# Patient Record
Sex: MALE | URBAN - METROPOLITAN AREA
[De-identification: names, ages, dates, MRNs, and addresses within clinical notes are randomized per-mention and may not be internally consistent; named-entity substitution may affect disease eponyms.]

---

## 2021-05-03 ENCOUNTER — OUTPATIENT (OUTPATIENT)
Dept: OUTPATIENT SERVICES | Facility: HOSPITAL | Age: 76
LOS: 1 days | End: 2021-05-03
Payer: MEDICARE

## 2021-05-03 DIAGNOSIS — R13.10 DYSPHAGIA, UNSPECIFIED: ICD-10-CM

## 2021-05-03 PROCEDURE — 74220 X-RAY XM ESOPHAGUS 1CNTRST: CPT

## 2021-05-03 PROCEDURE — 74220 X-RAY XM ESOPHAGUS 1CNTRST: CPT | Mod: 26

## 2021-05-31 ENCOUNTER — INPATIENT (INPATIENT)
Facility: HOSPITAL | Age: 76
LOS: 6 days | Discharge: NURSING, REHAB OR LONG-TERM CARE | DRG: 56 | End: 2021-06-07
Attending: FAMILY MEDICINE | Admitting: HOSPITALIST
Payer: MEDICARE

## 2021-05-31 VITALS
OXYGEN SATURATION: 95 % | SYSTOLIC BLOOD PRESSURE: 164 MMHG | RESPIRATION RATE: 18 BRPM | HEART RATE: 75 BPM | TEMPERATURE: 98 F | DIASTOLIC BLOOD PRESSURE: 91 MMHG

## 2021-05-31 DIAGNOSIS — I63.9 CEREBRAL INFARCTION, UNSPECIFIED: ICD-10-CM

## 2021-05-31 LAB
ALBUMIN SERPL ELPH-MCNC: 4 G/DL — SIGNIFICANT CHANGE UP (ref 3.3–5.2)
ALP SERPL-CCNC: 88 U/L — SIGNIFICANT CHANGE UP (ref 40–120)
ALT FLD-CCNC: <5 U/L — SIGNIFICANT CHANGE UP
ANION GAP SERPL CALC-SCNC: 9 MMOL/L — SIGNIFICANT CHANGE UP (ref 5–17)
APTT BLD: 27.9 SEC — SIGNIFICANT CHANGE UP (ref 27.5–35.5)
AST SERPL-CCNC: 14 U/L — SIGNIFICANT CHANGE UP
BASOPHILS # BLD AUTO: 0.02 K/UL — SIGNIFICANT CHANGE UP (ref 0–0.2)
BASOPHILS NFR BLD AUTO: 0.3 % — SIGNIFICANT CHANGE UP (ref 0–2)
BILIRUB SERPL-MCNC: <0.2 MG/DL — LOW (ref 0.4–2)
BUN SERPL-MCNC: 18.1 MG/DL — SIGNIFICANT CHANGE UP (ref 8–20)
CALCIUM SERPL-MCNC: 9.3 MG/DL — SIGNIFICANT CHANGE UP (ref 8.6–10.2)
CHLORIDE SERPL-SCNC: 104 MMOL/L — SIGNIFICANT CHANGE UP (ref 98–107)
CHOLEST SERPL-MCNC: 171 MG/DL — SIGNIFICANT CHANGE UP
CO2 SERPL-SCNC: 28 MMOL/L — SIGNIFICANT CHANGE UP (ref 22–29)
CREAT SERPL-MCNC: 0.97 MG/DL — SIGNIFICANT CHANGE UP (ref 0.5–1.3)
EOSINOPHIL # BLD AUTO: 0.09 K/UL — SIGNIFICANT CHANGE UP (ref 0–0.5)
EOSINOPHIL NFR BLD AUTO: 1.5 % — SIGNIFICANT CHANGE UP (ref 0–6)
GLUCOSE BLDC GLUCOMTR-MCNC: 33 MG/DL — CRITICAL LOW (ref 70–99)
GLUCOSE SERPL-MCNC: 94 MG/DL — SIGNIFICANT CHANGE UP (ref 70–99)
HCT VFR BLD CALC: 36.6 % — LOW (ref 39–50)
HDLC SERPL-MCNC: 84 MG/DL — SIGNIFICANT CHANGE UP
HGB BLD-MCNC: 11.8 G/DL — LOW (ref 13–17)
IMM GRANULOCYTES NFR BLD AUTO: 0.2 % — SIGNIFICANT CHANGE UP (ref 0–1.5)
INR BLD: 1.1 RATIO — SIGNIFICANT CHANGE UP (ref 0.88–1.16)
LIPID PNL WITH DIRECT LDL SERPL: 81 MG/DL — SIGNIFICANT CHANGE UP
LYMPHOCYTES # BLD AUTO: 2.56 K/UL — SIGNIFICANT CHANGE UP (ref 1–3.3)
LYMPHOCYTES # BLD AUTO: 42.4 % — SIGNIFICANT CHANGE UP (ref 13–44)
MCHC RBC-ENTMCNC: 30.2 PG — SIGNIFICANT CHANGE UP (ref 27–34)
MCHC RBC-ENTMCNC: 32.2 GM/DL — SIGNIFICANT CHANGE UP (ref 32–36)
MCV RBC AUTO: 93.6 FL — SIGNIFICANT CHANGE UP (ref 80–100)
MONOCYTES # BLD AUTO: 0.54 K/UL — SIGNIFICANT CHANGE UP (ref 0–0.9)
MONOCYTES NFR BLD AUTO: 8.9 % — SIGNIFICANT CHANGE UP (ref 2–14)
NEUTROPHILS # BLD AUTO: 2.82 K/UL — SIGNIFICANT CHANGE UP (ref 1.8–7.4)
NEUTROPHILS NFR BLD AUTO: 46.7 % — SIGNIFICANT CHANGE UP (ref 43–77)
NON HDL CHOLESTEROL: 87 MG/DL — SIGNIFICANT CHANGE UP
PLATELET # BLD AUTO: 206 K/UL — SIGNIFICANT CHANGE UP (ref 150–400)
POTASSIUM SERPL-MCNC: 3.8 MMOL/L — SIGNIFICANT CHANGE UP (ref 3.5–5.3)
POTASSIUM SERPL-SCNC: 3.8 MMOL/L — SIGNIFICANT CHANGE UP (ref 3.5–5.3)
PROT SERPL-MCNC: 7.4 G/DL — SIGNIFICANT CHANGE UP (ref 6.6–8.7)
PROTHROM AB SERPL-ACNC: 12.7 SEC — SIGNIFICANT CHANGE UP (ref 10.6–13.6)
RBC # BLD: 3.91 M/UL — LOW (ref 4.2–5.8)
RBC # FLD: 13.4 % — SIGNIFICANT CHANGE UP (ref 10.3–14.5)
SODIUM SERPL-SCNC: 141 MMOL/L — SIGNIFICANT CHANGE UP (ref 135–145)
TRIGL SERPL-MCNC: 30 MG/DL — SIGNIFICANT CHANGE UP
TROPONIN T SERPL-MCNC: <0.01 NG/ML — SIGNIFICANT CHANGE UP (ref 0–0.06)
WBC # BLD: 6.04 K/UL — SIGNIFICANT CHANGE UP (ref 3.8–10.5)
WBC # FLD AUTO: 6.04 K/UL — SIGNIFICANT CHANGE UP (ref 3.8–10.5)

## 2021-05-31 PROCEDURE — 70450 CT HEAD/BRAIN W/O DYE: CPT | Mod: 26,59,MA

## 2021-05-31 PROCEDURE — 93010 ELECTROCARDIOGRAM REPORT: CPT

## 2021-05-31 PROCEDURE — 0042T: CPT

## 2021-05-31 PROCEDURE — 71045 X-RAY EXAM CHEST 1 VIEW: CPT | Mod: 26

## 2021-05-31 PROCEDURE — 70496 CT ANGIOGRAPHY HEAD: CPT | Mod: 26,MA

## 2021-05-31 PROCEDURE — 99291 CRITICAL CARE FIRST HOUR: CPT

## 2021-05-31 PROCEDURE — 70498 CT ANGIOGRAPHY NECK: CPT | Mod: 26,MA

## 2021-05-31 RX ORDER — ASPIRIN/CALCIUM CARB/MAGNESIUM 324 MG
81 TABLET ORAL DAILY
Refills: 0 | Status: DISCONTINUED | OUTPATIENT
Start: 2021-05-31 | End: 2021-06-07

## 2021-05-31 RX ORDER — DEXTROSE 50 % IN WATER 50 %
50 SYRINGE (ML) INTRAVENOUS ONCE
Refills: 0 | Status: COMPLETED | OUTPATIENT
Start: 2021-05-31 | End: 2021-05-31

## 2021-05-31 RX ADMIN — Medication 50 MILLILITER(S): at 22:10

## 2021-05-31 NOTE — PROGRESS NOTE ADULT - SUBJECTIVE AND OBJECTIVE BOX
Telestroke 05.31.2021  (Dr. Jin)    76 y/o male with PMHx of Glaucoma, Parkinson's, dysphagia (tolerates solids), and HTN presents to ED c/o weakness. Patient's wife reports while patient was eating at about 7pm tonight, he was missing his mouth, and going towards his eyes, and said he was unable to use his right side. As per patient, at 4pm, he noticed he was having trouble using his right hand (clumsy hand).    MEDICATIONS:  aspirin  chewable 81 milliGRAM(s) Oral daily    PHYSICAL EXAMINATION    ICU Vital Signs Last 24 Hrs  T(C): 36.7 (31 May 2021 21:46), Max: 36.7 (31 May 2021 21:46)  T(F): 98 (31 May 2021 21:46), Max: 98 (31 May 2021 21:46)  HR: 56 (31 May 2021 22:55) (56 - 75)  BP: 152/71 (31 May 2021 22:55) (152/71 - 164/91)  RR: 20 (31 May 2021 22:55) (18 - 20)  SpO2: 96% (31 May 2021 22:55) (95% - 96%)    PHYSICAL EXAM: Patient weight 77.4 kg  · CONSTITUTIONAL: Well appearing, awake, alert, oriented to person, place, time/situation and in no apparent distress.  · ENMT: Airway patent, Nasal mucosa clear. Mouth with normal mucosa. Throat has no vesicles, no oropharyngeal exudates and uvula is midline.  · CARDIAC: Normal rate, regular rhythm.  Heart sounds S1, S2.  No murmurs, rubs or gallops.  · RESPIRATORY: Breath sounds clear and equal bilaterally.  · GASTROINTESTINAL: Abdomen soft, non-tender, no guarding.  · NEUROLOGICAL: NIH score of 3  · SKIN: Cool to touch, and dry    NIH Stroke Scale:   · NIH Stroke Scale: LOC	(0) Alert; keenly responsive  · NIH Stroke Scale: LOC Question	(0) Answers both questions correctly  · NIH Stroke Scale: LOC Command	(0) Performs both tasks correctly  · NIH Stroke Scale: Gaze	(0) Normal  · NIH Stroke Scale: Visual	(0) No visual loss  · NIH Stroke Scale: Facial	(0) Normal symmetrical movements  · NIH Stroke Scale: Arm Left	(0) No drift; limb holds 90 (or 45) degrees for full 10 secs  · NIH Stroke Scale: Arm Right	(0) No drift; limb holds 90 (or 45) degrees for full 10 secs  · NIH Stroke Scale: Leg Left	(0) No drift; leg holds 30 degree position for full 5 secs  · NIH Stroke Scale: Leg Right	(0) No drift; leg holds 30 degree position for full 5 secs  · NIH Stroke Scale: Ataxia	(2) Present in two limbs  · NIH Stroke Scale: Sensory	(1) Mild-to-moderate sensory loss; patient feels pinprick is less sharp or is dull on the affected side; or there is a loss of superficial pain with pinprick, but patient is aware of being touched  · NIH Stroke Scale: Language	(0) No aphasia; normal  · NIH Stroke Scale: Dysarthria	(0) Normal  · NIH Stroke Scale: Extinct Inattention	(0) No abnormality  · NIH Stroke Scale: Total	3    LABS:                        11.8   6.04  )-----------( 206      ( 31 May 2021 21:54 )             36.6     05-31    141  |  104  |  18.1  ----------------------------<  94  3.8   |  28.0  |  0.97    Ca    9.3      31 May 2021 21:54    TPro  7.4  /  Alb  4.0  /  TBili  <0.2<L>  /  DBili  x   /  AST  14  /  ALT  <5  /  AlkPhos  88  05-31    PT/INR - ( 31 May 2021 21:54 )   PT: 12.7 sec;   INR: 1.10 ratio    PTT - ( 31 May 2021 21:54 )  PTT:27.9 sec    CARDIAC MARKERS ( 31 May 2021 21:54 )  x     / <0.01 ng/mL / x     / x     / x        CAPILLARY BLOOD GLUCOSE    POCT Blood Glucose.: 33 mg/dL (31 May 2021 23:30)  POCT Blood Glucose.: 41 mg/dL (31 May 2021 22:25)  POCT Blood Glucose.: 37 mg/dL (31 May 2021 22:23)  POCT Blood Glucose.: 58 mg/dL (31 May 2021 21:59)  POCT Blood Glucose.: 38 mg/dL (31 May 2021 21:58)    Unremarkable, unenhanced CT head.  CBF <30%: 0 ml  Tmax > 6s: 0 ml  Mismatch volume=  0 ml  CT PERFUSION: Normal perfusion..  CTA BRAIN: Patent intracranial circulation. No flow-limiting stenosis or occlusion.  CTA NECK: Patent cervical vasculature. No flow limiting stenosis or occlusion.    Recommendations:  (1) correction of hypoglycemia (persistence of neurological symptoms after D50 amp),  (2) ASA 81 mg daily, Plavix 75 mg daily  (3) goal sBP 130-160 mmHg  (4) MRI to R/O capsular ischemic event - reassess dAPT after MRI  (5) TTE  (6) statin  (7) stroke work-up    Thank you for referral.  Khurram Pool MD,  [facility]  [phone]             Telestroke 05.31.2021  (Dr. Jin)    76 y/o male with PMHx of Glaucoma, Parkinson's, dysphagia (tolerates solids), and HTN presents to ED c/o weakness. Patient's wife reports while patient was eating at about 7pm tonight, he was missing his mouth, and going towards his eyes, and said he was unable to use his right side. As per patient, at 4pm, he noticed he was having trouble using his right hand (clumsy hand).    MEDICATIONS:  aspirin  chewable 81 milliGRAM(s) Oral daily    PHYSICAL EXAMINATION    ICU Vital Signs Last 24 Hrs  T(C): 36.7 (31 May 2021 21:46), Max: 36.7 (31 May 2021 21:46)  T(F): 98 (31 May 2021 21:46), Max: 98 (31 May 2021 21:46)  HR: 56 (31 May 2021 22:55) (56 - 75)  BP: 152/71 (31 May 2021 22:55) (152/71 - 164/91)  RR: 20 (31 May 2021 22:55) (18 - 20)  SpO2: 96% (31 May 2021 22:55) (95% - 96%)    PHYSICAL EXAM: Patient weight 77.4 kg  · CONSTITUTIONAL: Well appearing, awake, alert, oriented to person, place, time/situation and in no apparent distress.  · ENMT: Airway patent, Nasal mucosa clear. Mouth with normal mucosa. Throat has no vesicles, no oropharyngeal exudates and uvula is midline.  · CARDIAC: Normal rate, regular rhythm.  Heart sounds S1, S2.  No murmurs, rubs or gallops.  · RESPIRATORY: Breath sounds clear and equal bilaterally.  · GASTROINTESTINAL: Abdomen soft, non-tender, no guarding.  · NEUROLOGICAL: NIH score of 3  · SKIN: Cool to touch, and dry    NIH Stroke Scale:   · NIH Stroke Scale: LOC	(0) Alert; keenly responsive  · NIH Stroke Scale: LOC Question	(0) Answers both questions correctly  · NIH Stroke Scale: LOC Command	(0) Performs both tasks correctly  · NIH Stroke Scale: Gaze	(0) Normal  · NIH Stroke Scale: Visual	(0) No visual loss  · NIH Stroke Scale: Facial	(0) Normal symmetrical movements  · NIH Stroke Scale: Arm Left	(0) No drift; limb holds 90 (or 45) degrees for full 10 secs  · NIH Stroke Scale: Arm Right	(0) No drift; limb holds 90 (or 45) degrees for full 10 secs  · NIH Stroke Scale: Leg Left	(0) No drift; leg holds 30 degree position for full 5 secs  · NIH Stroke Scale: Leg Right	(0) No drift; leg holds 30 degree position for full 5 secs  · NIH Stroke Scale: Ataxia	(2) Present in two limbs  · NIH Stroke Scale: Sensory	(1) Mild-to-moderate sensory loss; patient feels pinprick is less sharp or is dull on the affected side; or there is a loss of superficial pain with pinprick, but patient is aware of being touched  · NIH Stroke Scale: Language	(0) No aphasia; normal  · NIH Stroke Scale: Dysarthria	(0) Normal  · NIH Stroke Scale: Extinct Inattention	(0) No abnormality  · NIH Stroke Scale: Total	3    LABS:                        11.8   6.04  )-----------( 206      ( 31 May 2021 21:54 )             36.6     05-31    141  |  104  |  18.1  ----------------------------<  94  3.8   |  28.0  |  0.97    Ca    9.3      31 May 2021 21:54    TPro  7.4  /  Alb  4.0  /  TBili  <0.2<L>  /  DBili  x   /  AST  14  /  ALT  <5  /  AlkPhos  88  05-31    PT/INR - ( 31 May 2021 21:54 )   PT: 12.7 sec;   INR: 1.10 ratio    PTT - ( 31 May 2021 21:54 )  PTT:27.9 sec    CARDIAC MARKERS ( 31 May 2021 21:54 )  x     / <0.01 ng/mL / x     / x     / x        CAPILLARY BLOOD GLUCOSE    POCT Blood Glucose.: 33 mg/dL (31 May 2021 23:30)  POCT Blood Glucose.: 41 mg/dL (31 May 2021 22:25)  POCT Blood Glucose.: 37 mg/dL (31 May 2021 22:23)  POCT Blood Glucose.: 58 mg/dL (31 May 2021 21:59)  POCT Blood Glucose.: 38 mg/dL (31 May 2021 21:58)    Unremarkable, unenhanced CT head.  CBF <30%: 0 ml  Tmax > 6s: 0 ml  Mismatch volume=  0 ml  CT PERFUSION: Normal perfusion..  CTA BRAIN: Patent intracranial circulation. No flow-limiting stenosis or occlusion.  CTA NECK: Patent cervical vasculature. No flow limiting stenosis or occlusion.    Recommendations:  (1) correction of hypoglycemia (persistence of neurological symptoms after D50 amp),  (2) ASA 81 mg daily  (3) goal sBP 130-160 mmHg  (4) MRI to R/O capsular ischemic event - reassess dAPT after MRI  (5) TTE  (6) statin  (7) stroke work-up    Thank you for referral.  Khurram Pool MD,  [facility]  [phone]

## 2021-05-31 NOTE — ED PROVIDER NOTE - PROGRESS NOTE DETAILS
Hasmukh ROMO for ED attending, Dr. Payne. Spoke with Dr. Sonu Pool, will continue conversation after patient done receiving scans to talk about possible tPA administration. Will talk to wife to ascertain a better last well known time for patient. Hasmukh ROMO for ED attending, Dr. Payne. Patient found to have mild hypoglycemia, was given an amp of Dextrose without any change in symptoms. Hasmukh ROMO for ED attending, Dr. Payne. Patient found to have mild hypoglycemia, was given an amp of Dextrose without any change in symptoms. No sign of acute hypoglycemia; diaphoresis, confusion, nausea, vomiting and stable  neurologic symptoms Patient stable. Patient awake, alert, and neurologically stable. Accu-Chek are as noted but bmp is noted to be normal. Unclear cause of abnl findings on accucheck but normal cmp and bml. Patient will be continued to monitored.  Repeat BMP ordered.

## 2021-05-31 NOTE — ED ADULT NURSE NOTE - OBJECTIVE STATEMENT
A&Ox4, RR even and unlabored. Speaking in full coherent sentences.  Skin is warm and dry.  PT presenting to ED for right upper extremity weakness.  As per pts wife she noticed at 7pm that the pt was having difficulty feeding himself.  PT states he fist noticed the weakness around 4:30pm while trying to complete a puzzle. Code Stroke activated, patient brought ot CT. Scan.  Hx of parkinson's, HTN and glaucoma.

## 2021-05-31 NOTE — ED ADULT TRIAGE NOTE - CHIEF COMPLAINT QUOTE
Pt. BIBA for new onset right sided weakness. LKW 1930. Pt. wife states he was having difficulty using his hand eating. Pt. has chronic left sided weakness form parkinson's. Pt. has equal strength bilaterally, but states his right arm feels weaker then normal.  MD Sarmiento called to bedside for stroke eval. Code Stroke called. Pt. BIBA for new onset right sided weakness. LKW 1930. Pt. wife states he was having difficulty using his right arm, missing his mouth with the spoon. Pt. has chronic left sided weakness form parkinson's. Pt. has equal strength bilaterally, but states his right arm feels weaker then normal.  MD Sarmiento called to bedside for stroke eval. Code Stroke called.

## 2021-05-31 NOTE — ED ADULT NURSE REASSESSMENT NOTE - NS ED NURSE REASSESS COMMENT FT1
Repeat FS performed following Dextrose.  2 machines used both with low reading in the 40s and 30s.  Dr. Payne made aware.  Awaiting BMP results.  No new orders at this time.  PT remain asymptomatic.  A&Ox4.

## 2021-05-31 NOTE — ED PROVIDER NOTE - OBJECTIVE STATEMENT
76 y/o male with PMHx of Glaucoma, Parkinson's, and HTN presents to ED c/o weakness. Patient's wife reports while patient was eating at about 7pm tonight, he was missing his mouth, and going towards his eyes, and said he was unable to use his right side. As per patient, at 4pm, he noticed he was having trouble using his right hand.     Neurologist: Dr. Feliz

## 2021-05-31 NOTE — ED ADULT NURSE NOTE - NSIMPLEMENTINTERV_GEN_ALL_ED
Implemented All Fall Risk Interventions:  [place] to call system. Call bell, personal items and telephone within reach. Instruct patient to call for assistance. Room bathroom lighting operational. Non-slip footwear when patient is off stretcher. Physically safe environment: no spills, clutter or unnecessary equipment. Stretcher in lowest position, wheels locked, appropriate side rails in place. Provide visual cue, wrist band, yellow gown, etc. Monitor gait and stability. Monitor for mental status changes and reorient to person, place, and time. Review medications for side effects contributing to fall risk. Reinforce activity limits and safety measures with patient and family.

## 2021-05-31 NOTE — ED PROVIDER NOTE - CARE PLAN
Principal Discharge DX:	Cerebrovascular accident (CVA), unspecified mechanism  Secondary Diagnosis:	Essential hypertension

## 2021-05-31 NOTE — ED ADULT NURSE NOTE - CHIEF COMPLAINT QUOTE
Pt. BIBA for new onset right sided weakness. LKW 1930. Pt. wife states he was having difficulty using his right arm, missing his mouth with the spoon. Pt. has chronic left sided weakness form parkinson's. Pt. has equal strength bilaterally, but states his right arm feels weaker then normal.  MD Sarmiento called to bedside for stroke eval. Code Stroke called.

## 2021-05-31 NOTE — ED ADULT NURSE REASSESSMENT NOTE - NS ED NURSE REASSESS COMMENT FT1
PT FS continues to ready in the 30s, Dr. Payne made aware.  Pt shows no decline in status.  Is A&Ox4.  Repeat BMP collected and sent to lab to compare results.

## 2021-06-01 DIAGNOSIS — I63.9 CEREBRAL INFARCTION, UNSPECIFIED: ICD-10-CM

## 2021-06-01 DIAGNOSIS — H40.1130 PRIMARY OPEN-ANGLE GLAUCOMA, BILATERAL, STAGE UNSPECIFIED: ICD-10-CM

## 2021-06-01 DIAGNOSIS — I10 ESSENTIAL (PRIMARY) HYPERTENSION: ICD-10-CM

## 2021-06-01 DIAGNOSIS — E16.1 OTHER HYPOGLYCEMIA: ICD-10-CM

## 2021-06-01 DIAGNOSIS — N40.0 BENIGN PROSTATIC HYPERPLASIA WITHOUT LOWER URINARY TRACT SYMPTOMS: ICD-10-CM

## 2021-06-01 DIAGNOSIS — G20 PARKINSON'S DISEASE: ICD-10-CM

## 2021-06-01 LAB
A1C WITH ESTIMATED AVERAGE GLUCOSE RESULT: 5.5 % — SIGNIFICANT CHANGE UP (ref 4–5.6)
ANION GAP SERPL CALC-SCNC: 10 MMOL/L — SIGNIFICANT CHANGE UP (ref 5–17)
ANION GAP SERPL CALC-SCNC: 9 MMOL/L — SIGNIFICANT CHANGE UP (ref 5–17)
BASE EXCESS BLDV CALC-SCNC: 5.3 MMOL/L — HIGH (ref -2–2)
BUN SERPL-MCNC: 14.7 MG/DL — SIGNIFICANT CHANGE UP (ref 8–20)
BUN SERPL-MCNC: 15.2 MG/DL — SIGNIFICANT CHANGE UP (ref 8–20)
CALCIUM SERPL-MCNC: 9.1 MG/DL — SIGNIFICANT CHANGE UP (ref 8.6–10.2)
CALCIUM SERPL-MCNC: 9.4 MG/DL — SIGNIFICANT CHANGE UP (ref 8.6–10.2)
CHLORIDE SERPL-SCNC: 104 MMOL/L — SIGNIFICANT CHANGE UP (ref 98–107)
CHLORIDE SERPL-SCNC: 104 MMOL/L — SIGNIFICANT CHANGE UP (ref 98–107)
CO2 SERPL-SCNC: 26 MMOL/L — SIGNIFICANT CHANGE UP (ref 22–29)
CO2 SERPL-SCNC: 30 MMOL/L — HIGH (ref 22–29)
CREAT SERPL-MCNC: 0.74 MG/DL — SIGNIFICANT CHANGE UP (ref 0.5–1.3)
CREAT SERPL-MCNC: 0.93 MG/DL — SIGNIFICANT CHANGE UP (ref 0.5–1.3)
ESTIMATED AVERAGE GLUCOSE: 111 MG/DL — SIGNIFICANT CHANGE UP (ref 68–114)
GAS PNL BLDV: SIGNIFICANT CHANGE UP
GLUCOSE SERPL-MCNC: 125 MG/DL — HIGH (ref 70–99)
GLUCOSE SERPL-MCNC: 76 MG/DL — SIGNIFICANT CHANGE UP (ref 70–99)
HCO3 BLDV-SCNC: 28 MMOL/L — HIGH (ref 20–26)
PCO2 BLDV: 63 MMHG — HIGH (ref 35–50)
PH BLDV: 7.33 — SIGNIFICANT CHANGE UP (ref 7.32–7.43)
PO2 BLDV: 32 MMHG — SIGNIFICANT CHANGE UP (ref 25–45)
POTASSIUM SERPL-MCNC: 3.7 MMOL/L — SIGNIFICANT CHANGE UP (ref 3.5–5.3)
POTASSIUM SERPL-MCNC: 4 MMOL/L — SIGNIFICANT CHANGE UP (ref 3.5–5.3)
POTASSIUM SERPL-SCNC: 3.7 MMOL/L — SIGNIFICANT CHANGE UP (ref 3.5–5.3)
POTASSIUM SERPL-SCNC: 4 MMOL/L — SIGNIFICANT CHANGE UP (ref 3.5–5.3)
SAO2 % BLDV: 54 % — SIGNIFICANT CHANGE UP
SARS-COV-2 RNA SPEC QL NAA+PROBE: SIGNIFICANT CHANGE UP
SODIUM SERPL-SCNC: 140 MMOL/L — SIGNIFICANT CHANGE UP (ref 135–145)
SODIUM SERPL-SCNC: 143 MMOL/L — SIGNIFICANT CHANGE UP (ref 135–145)

## 2021-06-01 PROCEDURE — 99223 1ST HOSP IP/OBS HIGH 75: CPT

## 2021-06-01 PROCEDURE — 99231 SBSQ HOSP IP/OBS SF/LOW 25: CPT | Mod: 95

## 2021-06-01 PROCEDURE — 93306 TTE W/DOPPLER COMPLETE: CPT | Mod: 26

## 2021-06-01 PROCEDURE — 12345: CPT | Mod: NC

## 2021-06-01 RX ORDER — CARBIDOPA AND LEVODOPA 25; 100 MG/1; MG/1
2 TABLET ORAL
Qty: 0 | Refills: 0 | DISCHARGE

## 2021-06-01 RX ORDER — CARBIDOPA AND LEVODOPA 25; 100 MG/1; MG/1
2 TABLET ORAL DAILY
Refills: 0 | Status: DISCONTINUED | OUTPATIENT
Start: 2021-06-01 | End: 2021-06-07

## 2021-06-01 RX ORDER — ATORVASTATIN CALCIUM 80 MG/1
80 TABLET, FILM COATED ORAL AT BEDTIME
Refills: 0 | Status: DISCONTINUED | OUTPATIENT
Start: 2021-06-01 | End: 2021-06-07

## 2021-06-01 RX ORDER — ACETAZOLAMIDE 250 MG/1
1 TABLET ORAL
Qty: 0 | Refills: 0 | DISCHARGE

## 2021-06-01 RX ORDER — ACETAZOLAMIDE 250 MG/1
250 TABLET ORAL AT BEDTIME
Refills: 0 | Status: DISCONTINUED | OUTPATIENT
Start: 2021-06-01 | End: 2021-06-07

## 2021-06-01 RX ORDER — METOPROLOL TARTRATE 50 MG
100 TABLET ORAL DAILY
Refills: 0 | Status: DISCONTINUED | OUTPATIENT
Start: 2021-06-01 | End: 2021-06-07

## 2021-06-01 RX ORDER — NETARSUDIL AND LATANOPROST OPHTHALMIC SOLUTION, 0.02%/0.005% .2; .05 MG/ML; MG/ML
1 SOLUTION/ DROPS OPHTHALMIC; TOPICAL
Qty: 0 | Refills: 0 | DISCHARGE

## 2021-06-01 RX ORDER — SELEGILINE HYDROCHLORIDE 1.25 MG/1
1 TABLET, ORALLY DISINTEGRATING ORAL
Qty: 0 | Refills: 0 | DISCHARGE

## 2021-06-01 RX ORDER — LATANOPROST 0.05 MG/ML
1 SOLUTION/ DROPS OPHTHALMIC; TOPICAL AT BEDTIME
Refills: 0 | Status: DISCONTINUED | OUTPATIENT
Start: 2021-06-01 | End: 2021-06-07

## 2021-06-01 RX ORDER — CARBIDOPA AND LEVODOPA 25; 100 MG/1; MG/1
1.5 TABLET ORAL AT BEDTIME
Refills: 0 | Status: DISCONTINUED | OUTPATIENT
Start: 2021-06-01 | End: 2021-06-07

## 2021-06-01 RX ORDER — HEPARIN SODIUM 5000 [USP'U]/ML
5000 INJECTION INTRAVENOUS; SUBCUTANEOUS EVERY 12 HOURS
Refills: 0 | Status: DISCONTINUED | OUTPATIENT
Start: 2021-06-01 | End: 2021-06-07

## 2021-06-01 RX ORDER — CARBIDOPA AND LEVODOPA 25; 100 MG/1; MG/1
1.5 TABLET ORAL
Qty: 0 | Refills: 0 | DISCHARGE

## 2021-06-01 RX ORDER — TAMSULOSIN HYDROCHLORIDE 0.4 MG/1
0.4 CAPSULE ORAL AT BEDTIME
Refills: 0 | Status: DISCONTINUED | OUTPATIENT
Start: 2021-06-01 | End: 2021-06-07

## 2021-06-01 RX ORDER — PILOCARPINE HCL 4 %
1 DROPS OPHTHALMIC (EYE)
Qty: 0 | Refills: 0 | DISCHARGE

## 2021-06-01 RX ORDER — CARBIDOPA AND LEVODOPA 25; 100 MG/1; MG/1
1.5 TABLET ORAL
Refills: 0 | Status: DISCONTINUED | OUTPATIENT
Start: 2021-06-01 | End: 2021-06-07

## 2021-06-01 RX ORDER — SELEGILINE HYDROCHLORIDE 1.25 MG/1
5 TABLET, ORALLY DISINTEGRATING ORAL
Refills: 0 | Status: DISCONTINUED | OUTPATIENT
Start: 2021-06-01 | End: 2021-06-07

## 2021-06-01 RX ORDER — TIMOLOL 0.5 %
1 DROPS OPHTHALMIC (EYE)
Qty: 0 | Refills: 0 | DISCHARGE

## 2021-06-01 RX ORDER — PILOCARPINE HCL 4 %
1 DROPS OPHTHALMIC (EYE) THREE TIMES A DAY
Refills: 0 | Status: DISCONTINUED | OUTPATIENT
Start: 2021-06-01 | End: 2021-06-07

## 2021-06-01 RX ORDER — METOPROLOL TARTRATE 50 MG
1 TABLET ORAL
Qty: 0 | Refills: 0 | DISCHARGE

## 2021-06-01 RX ORDER — BRINZOLAMIDE/BRIMONIDINE TARTRATE 10; 2 MG/ML; MG/ML
1 SUSPENSION/ DROPS OPHTHALMIC
Qty: 0 | Refills: 0 | DISCHARGE

## 2021-06-01 RX ORDER — TIMOLOL 0.5 %
1 DROPS OPHTHALMIC (EYE)
Refills: 0 | Status: DISCONTINUED | OUTPATIENT
Start: 2021-06-01 | End: 2021-06-07

## 2021-06-01 RX ORDER — LATANOPROST 0.05 MG/ML
1 SOLUTION/ DROPS OPHTHALMIC; TOPICAL
Qty: 0 | Refills: 0 | DISCHARGE

## 2021-06-01 RX ORDER — ASPIRIN/CALCIUM CARB/MAGNESIUM 324 MG
1 TABLET ORAL
Qty: 0 | Refills: 0 | DISCHARGE

## 2021-06-01 RX ORDER — TAMSULOSIN HYDROCHLORIDE 0.4 MG/1
1 CAPSULE ORAL
Qty: 0 | Refills: 0 | DISCHARGE

## 2021-06-01 RX ORDER — BRIMONIDINE TARTRATE 2 MG/MG
1 SOLUTION/ DROPS OPHTHALMIC THREE TIMES A DAY
Refills: 0 | Status: DISCONTINUED | OUTPATIENT
Start: 2021-06-01 | End: 2021-06-07

## 2021-06-01 RX ADMIN — HEPARIN SODIUM 5000 UNIT(S): 5000 INJECTION INTRAVENOUS; SUBCUTANEOUS at 05:20

## 2021-06-01 RX ADMIN — SELEGILINE HYDROCHLORIDE 5 MILLIGRAM(S): 1.25 TABLET, ORALLY DISINTEGRATING ORAL at 16:45

## 2021-06-01 RX ADMIN — HEPARIN SODIUM 5000 UNIT(S): 5000 INJECTION INTRAVENOUS; SUBCUTANEOUS at 17:04

## 2021-06-01 RX ADMIN — SELEGILINE HYDROCHLORIDE 5 MILLIGRAM(S): 1.25 TABLET, ORALLY DISINTEGRATING ORAL at 05:21

## 2021-06-01 RX ADMIN — Medication 1 DROP(S): at 17:02

## 2021-06-01 RX ADMIN — BRIMONIDINE TARTRATE 1 DROP(S): 2 SOLUTION/ DROPS OPHTHALMIC at 22:06

## 2021-06-01 RX ADMIN — Medication 1 DROP(S): at 05:19

## 2021-06-01 RX ADMIN — CARBIDOPA AND LEVODOPA 1.5 TABLET(S): 25; 100 TABLET ORAL at 16:45

## 2021-06-01 RX ADMIN — Medication 1 DROP(S): at 22:06

## 2021-06-01 RX ADMIN — BRIMONIDINE TARTRATE 1 DROP(S): 2 SOLUTION/ DROPS OPHTHALMIC at 17:02

## 2021-06-01 RX ADMIN — CARBIDOPA AND LEVODOPA 2 TABLET(S): 25; 100 TABLET ORAL at 12:47

## 2021-06-01 RX ADMIN — Medication 1 DROP(S): at 05:20

## 2021-06-01 RX ADMIN — CARBIDOPA AND LEVODOPA 1.5 TABLET(S): 25; 100 TABLET ORAL at 22:05

## 2021-06-01 RX ADMIN — TAMSULOSIN HYDROCHLORIDE 0.4 MILLIGRAM(S): 0.4 CAPSULE ORAL at 22:06

## 2021-06-01 RX ADMIN — ATORVASTATIN CALCIUM 80 MILLIGRAM(S): 80 TABLET, FILM COATED ORAL at 22:06

## 2021-06-01 RX ADMIN — BRIMONIDINE TARTRATE 1 DROP(S): 2 SOLUTION/ DROPS OPHTHALMIC at 12:47

## 2021-06-01 RX ADMIN — Medication 1 DROP(S): at 14:49

## 2021-06-01 RX ADMIN — ACETAZOLAMIDE 250 MILLIGRAM(S): 250 TABLET ORAL at 22:05

## 2021-06-01 RX ADMIN — Medication 81 MILLIGRAM(S): at 12:47

## 2021-06-01 NOTE — OCCUPATIONAL THERAPY INITIAL EVALUATION ADULT - PHYSICAL ASSIST/NONPHYSICAL ASSIST, REHAB EVAL
verbal cues/nonverbal cues (demo/gestures) verbal cues/nonverbal cues (demo/gestures)/1 person assist

## 2021-06-01 NOTE — OCCUPATIONAL THERAPY INITIAL EVALUATION ADULT - SPECIAL TRAINING, OT EVAL
Functional mobility for short distances with contact guard assistance due to decreased strength, decreased postural control and decreased balance; cues for foot placement, body positioning and weightshifting. Pt requires increased time and verbal/tactile cues throughout mobility for safety.

## 2021-06-01 NOTE — OCCUPATIONAL THERAPY INITIAL EVALUATION ADULT - ADDITIONAL COMMENTS
Pt lives in basement of a house with 7 steps down to enter and no steps inside. Bathroom has shower stall with curtains. Pt owns cane. Pt is right handed. Pt does not drive. Pt's wife is able and available to assist upon discharge as needed. Pt reports independence with ADLs prior to admission however some days when feeling tired, wife will assist incidentally with ADLs (ie. putting on jacket, tying shoes, etc).

## 2021-06-01 NOTE — PROGRESS NOTE ADULT - SUBJECTIVE AND OBJECTIVE BOX
ANASTACIO ARMEY    [id number]    75y      Male    CC: cva    INTERVAL HPI/OVERNIGHT EVENTS: pt seen and examined. still with R hand difficulty    REVIEW OF SYSTEMS:    CONSTITUTIONAL: No fever, weight loss  RESPIRATORY: No cough, wheezing, hemoptysis; No shortness of breath  CARDIOVASCULAR: No chest pain, palpitations  GASTROINTESTINAL: No abdominal or epigastric pain. No nausea, vomiting  NEUROLOGICAL: No headaches    Vital Signs Last 24 Hrs  T(C): 36.7 (01 Jun 2021 11:25), Max: 36.8 (01 Jun 2021 07:15)  T(F): 98 (01 Jun 2021 11:25), Max: 98.2 (01 Jun 2021 07:15)  HR: 55 (01 Jun 2021 11:25) (53 - 78)  BP: 139/81 (01 Jun 2021 11:25) (122/75 - 164/91)  BP(mean): 102 (01 Jun 2021 03:09) (102 - 102)  RR: 18 (01 Jun 2021 11:25) (18 - 20)  SpO2: 99% (01 Jun 2021 11:25) (95% - 100%)    PHYSICAL EXAM:    GENERAL: NAD  HEENT: PERRL, +EOMI  NECK: soft, supple  CHEST/LUNG: Clear to auscultation bilaterally; No wheezing  HEART: S1S2+, Regular rate and rhythm  ABDOMEN: Soft, Nontender, Nondistended; Bowel sounds present  SKIN: warm, dry  NEURO: AAOX3, +RUE dysmetria  PSYCH: normal affect    LABS:                        11.8   6.04  )-----------( 206      ( 31 May 2021 21:54 )             36.6     06-01    143  |  104  |  14.7  ----------------------------<  76  4.0   |  30.0<H>  |  0.93    Ca    9.4      01 Jun 2021 02:40    TPro  7.4  /  Alb  4.0  /  TBili  <0.2<L>  /  DBili  x   /  AST  14  /  ALT  <5  /  AlkPhos  88  05-31    PT/INR - ( 31 May 2021 21:54 )   PT: 12.7 sec;   INR: 1.10 ratio         PTT - ( 31 May 2021 21:54 )  PTT:27.9 sec        MEDICATIONS  (STANDING):  acetaZOLAMIDE    Tablet 250 milliGRAM(s) Oral at bedtime  aspirin  chewable 81 milliGRAM(s) Oral daily  atorvastatin 80 milliGRAM(s) Oral at bedtime  brimonidine 0.2% Ophthalmic Solution 1 Drop(s) Both EYES three times a day  carbidopa/levodopa  25/100 2 Tablet(s) Oral daily  carbidopa/levodopa  25/100 1.5 Tablet(s) Oral <User Schedule>  carbidopa/levodopa  25/100 1.5 Tablet(s) Oral at bedtime  heparin   Injectable 5000 Unit(s) SubCutaneous every 12 hours  latanoprost 0.005% Ophthalmic Solution 1 Drop(s) Both EYES at bedtime  metoprolol succinate  milliGRAM(s) Oral daily  pilocarpine 1% Solution 1 Drop(s) Right EYE three times a day  selegiline Oral Tab/Cap 5 milliGRAM(s) Oral two times a day  tamsulosin 0.4 milliGRAM(s) Oral at bedtime  timolol 0.5% Solution 1 Drop(s) Both EYES two times a day    MEDICATIONS  (PRN):      RADIOLOGY & ADDITIONAL TESTS:  < from: CT Brain Stroke Protocol (05.31.21 @ 21:57) >  IMPRESSION:  1. Unremarkable, unenhanced CT head.    Critical results relayed to Dr. Payne at 10:06 PM.    < end of copied text >  < from: CT Angio Neck w/ IV Cont (05.31.21 @ 22:06) >  IMPRESSION:    CT PERFUSION: Normal perfusion..    CTA BRAIN: Patent intracranial circulation. No flow-limiting stenosis or occlusion.    CTA NECK: Patent cervical vasculature. No flow limiting stenosis or occlusion.    < end of copied text >

## 2021-06-01 NOTE — CONSULT NOTE ADULT - ASSESSMENT
The patient is a 75y Male with baseline Parkinson's disease now with new difficulty with right hand.     Possible stroke.   Acute onset right hand difficulty with clumsy hand on exam.   Agree with checking MRI brain to assess for cerebellar or internal capsule infarct.   LDL: 81  Goal: < 70  Continue antiplatelet and statin.   Mobilize with physical and occupational therapy.     Parkinson's disease   Continue Sinemet and Selegiline.   PT/OT as above.   Swallow evaluation.     Discharge planning.   May need rehab.   He should follow up with his regular neurologist after discharge.     Case discussed with Dr Shannon.

## 2021-06-01 NOTE — SWALLOW BEDSIDE ASSESSMENT ADULT - SLP GENERAL OBSERVATIONS
Pt recd awake & upright in stretcher in ED, A&A Ox4, +hypophonic/hoarse, reduced speech intelligibility, 8/10 L hip pain, RN aware, tolerating RA no overt distress throughout.

## 2021-06-01 NOTE — H&P ADULT - ASSESSMENT
76 y/o male with likely acute/subacute ischemic CVA, pseudo-hypoglycemia, hx of Parkinsons disease, HTN, Glaucoma, BPH

## 2021-06-01 NOTE — SWALLOW BEDSIDE ASSESSMENT ADULT - COMMENTS
Discussed hx at length with Pt, reporting Parkinson's dx in 2016, w/progressive dysphagia w/thin liquid intake since, though did not seek intervention until early May. Pt reports recent ENT laryngoscopy ~1 week PTA, which was WFL, however MD rx thickened liquids prophylactically, w/completion of MBSV to r/o aspiration. Pt scheduled for outpatient MBSV @ [facility] on 6/28/21. Recent esophagram completed 5/3/21, results, "Apparent filling defect in the hypopharynx, differential diagnosis includes fibromuscular polyp. Recommend follow-up as clinically endoscopy or CT of neck with oral and IV contrast can be ordered as clinically indicated. Slightly delayed oral phase, patient has parkinsonism. Follow-up modified barium swallowing study can be ordered as clinically indicated".        No recent CXR available.

## 2021-06-01 NOTE — PHYSICAL THERAPY INITIAL EVALUATION ADULT - ADDITIONAL COMMENTS
Pt AxOx4 states he lives in basement with 7 MANUEL with 1 HR on right. Pt uses cane and was independent with dressing and transfers PTA, does not own RW. Pt wife is able to assist upon d/c.

## 2021-06-01 NOTE — H&P ADULT - HISTORY OF PRESENT ILLNESS
76 y/o male with hx of glaucoma, HTN, Parkinsons who resides at home with his wife. He uses a cane at baseline, and admits to being on dysphagia diet. Patient noted right hand was " not working well around 4pm yesterday." Then around 7pm his wife noted he wasnt putting the fork in the right spot while eating dinner, the food was hitting his cheek. Denies any HA, CP, SOB, palpations, hx of afib. Denies any falls. In the ED patient with CT/A/P without acute findings or LVO. NIH 3. Evaluated by Tele-stroke and not a TPA candidate based on LKW and low NIH. Of note patient with normal glucose on BMP, no hx of DM, and no neuro-glycopenic symptoms, however repeat accu checks with erroneous low readings.

## 2021-06-01 NOTE — H&P ADULT - PROBLEM SELECTOR PLAN 1
Admit to Tele, Q 4 neuro checks, Q 4 hour VS, Dysphagia diet, aspiration precautions, Aspirin, statin, check FLP/A1c, echo, mri, carotids as above with CTA, PT/OT, fall risk, Neurology f/u, Stroke protocol utilized, DVT-P w/boots/heparin

## 2021-06-01 NOTE — SWALLOW BEDSIDE ASSESSMENT ADULT - SLP PERTINENT HISTORY OF CURRENT PROBLEM
As per MD note, "74 y/o male with hx of glaucoma, HTN, Parkinsons who resides at home with his wife. He uses a cane at baseline, and admits to being on dysphagia diet. Patient noted right hand was " not working well around 4pm yesterday." Then around 7pm his wife noted he wasnt putting the fork in the right spot while eating dinner, the food was hitting his cheek. Denies any HA, CP, SOB, palpations, hx of afib. Denies any falls. In the ED patient with CT/A/P without acute findings or LVO. NIH 3. Evaluated by Tele-stroke and not a TPA candidate based on LKW and low NIH. Of note patient with normal glucose on BMP, no hx of DM, and no neuro-glycopenic symptoms, however repeat accu checks with erroneous low readings".

## 2021-06-01 NOTE — SWALLOW BEDSIDE ASSESSMENT ADULT - SWALLOW EVAL: RECOMMENDED FEEDING/EATING TECHNIQUES
100% supervision/assist/allow for swallow between intakes/crush medication (when feasible)/maintain upright posture during/after eating for 30 mins/oral hygiene/position upright (90 degrees)/small sips/bites

## 2021-06-01 NOTE — OCCUPATIONAL THERAPY INITIAL EVALUATION ADULT - PLANNED THERAPY INTERVENTIONS, OT EVAL
toilet/ADL retraining/balance training/bed mobility training/motor coordination training/neuromuscular re-education/parent/caregiver training.../ROM/strengthening/transfer training

## 2021-06-01 NOTE — SWALLOW BEDSIDE ASSESSMENT ADULT - SWALLOW EVAL: DIAGNOSIS
Pt presents w/mild oral & suspected mild-moderate pharyngeal dysphagia. Oral stage significant for intermittent oral holding of liquid boli, w/reduced bolus cohesion & resultant suspected premature pharyngeal entry across all liquid viscosities, given reduced oral musculature strength. Improved overall manipulation w/puree & mech soft. Suspected pharyngeal dysphagia w/mech soft & thin liquids, delayed throat clear w/wet vocal quality following both. Improvement & no s/s penetration or aspiration w/puree or nectar. Pt w/multiple swallows ~ x5-8 noted for each trial presented, Pt reports this has been present for ~1-2 years.

## 2021-06-01 NOTE — PHYSICAL THERAPY INITIAL EVALUATION ADULT - PERTINENT HX OF CURRENT PROBLEM, REHAB EVAL
Pt with hx of glaucoma, HTN, PD presents to ED with code stroke activated, difficulty with right hand.

## 2021-06-01 NOTE — H&P ADULT - PROBLEM SELECTOR PLAN 2
Patient with no glycopenic symptoms ie no nausea, sweating, confusion, palpitations. Deficits did not change with dextrose IV. BMP with normal glucose, not on any DM meds. Likely poor circulation to extremities causing false low readings. Will order 3rd BMP to f/u.

## 2021-06-01 NOTE — PROGRESS NOTE ADULT - ASSESSMENT
75y/oM PMH Parkinsons dx, HTN, glaucoma, BPH admitted for r/o cva     CVA   -[id number] checks  -LDL: 81  -cont asa, statin   -neuro recs appreciated  -CTH, CTA head neg for acute infarct or hemorrhage, neg for large vessel occlusion   -f/u MRI   -swallow eval: pureed with nectar thick liquids   -PT: home vs [facility]   75y/oM PMH Parkinsons dx, HTN, glaucoma, BPH admitted for r/o cva     CVA   -[id number] checks  -LDL: 81  -cont asa, statin   -neuro recs appreciated  -CTH, CTA head neg for acute infarct or hemorrhage, neg for large vessel occlusion   -f/u MRI   -swallow eval: pureed with nectar thick liquids   -PT: home vs [facility]    Hypoglycemia   -hgba1c 5.5  -not on DM meds, no glycopenic symptoms, no change with dextrose IV, likely false readings; glucose wnl on repeat bmp   -cont to monitor     HTN  -norvasc, toprol     Parkinson Disease   -fall risk protocol   -PT eval: home vs [facility]   -cont home meds     Glaucoma   -cont eye drops     BPH   -cont flomax

## 2021-06-01 NOTE — CONSULT NOTE ADULT - SUBJECTIVE AND OBJECTIVE BOX
[facility]                                        Neurology at [place]                                  Cortney Brito, & Carlos Alberto                                      [address]                                           [place], [place], [address]                                                [phone]        CC: R/o Stroke.     HISTORY:  The patient is a 75y Male with Parkinson's disease who follows with [facility]. He is on Sinemet and Selegiline.   He now presented with difficulty with the right hand.   He was eating and noted that he was having difficulty getting the fork to his mouth.   He ended up hitting his right cheek with the food.    He was brought to the ER and the stroke code was activated. He was evaluated by the tele-stroke service.   He was felt not to be a t-PA candidate.     PAST MEDICAL & SURGICAL HISTORY:  Parkinson disease  Glaucoma  HTN (hypertension)  History of BPH  No significant past surgical history    MEDICATIONS  (STANDING):  acetaZOLAMIDE    Tablet 250 milliGRAM(s) Oral at bedtime  aspirin  chewable 81 milliGRAM(s) Oral daily  atorvastatin 80 milliGRAM(s) Oral at bedtime  brimonidine 0.2% Ophthalmic Solution 1 Drop(s) Both EYES three times a day  carbidopa/levodopa  25/100 2 Tablet(s) Oral daily  carbidopa/levodopa  25/100 1.5 Tablet(s) Oral <User Schedule>  carbidopa/levodopa  25/100 1.5 Tablet(s) Oral at bedtime  heparin   Injectable 5000 Unit(s) SubCutaneous every 12 hours  latanoprost 0.005% Ophthalmic Solution 1 Drop(s) Both EYES at bedtime  metoprolol succinate  milliGRAM(s) Oral daily  pilocarpine 1% Solution 1 Drop(s) Right EYE three times a day  selegiline Oral Tab/Cap 5 milliGRAM(s) Oral two times a day  tamsulosin 0.4 milliGRAM(s) Oral at bedtime  timolol 0.5% Solution 1 Drop(s) Both EYES two times a day    Allergies  No Known Allergies    SOCIAL HISTORY:  Never smoker.   No alcohol.    FAMILY HISTORY:  No pertinent family history in first degree relatives  No known history of stroke (mother/father).   No family history of Parkinson's disease.     ROS:  Constitutional: The patient denies fevers or weight changes.  Neuro: As per HPI.  Eyes: Denies blurry vision.  Ears/nose/throat: Denies Tinnitus.   Cardiac: Denies chest pain. Denies palpitations.  Respiratory: Denies shortness of breath.  GI: Denies abdominal pain, nausea, or vomiting.  : Denies change in urinary pattern.  Integumentary: Denies rash.  Psych: Denies recent mood changes.  Heme: denies easy bleeding/bruising.    Exam:  Vital Signs Last 24 Hrs  T(C): 36.8 (01 Jun 2021 07:15), Max: 36.8 (01 Jun 2021 07:15)  T(F): 98.2 (01 Jun 2021 07:15), Max: 98.2 (01 Jun 2021 07:15)  HR: 53 (01 Jun 2021 07:15) (53 - 78)  BP: 146/74 (01 Jun 2021 07:15) (122/75 - 164/91)  BP(mean): 102 (01 Jun 2021 03:09) (102 - 102)  RR: 18 (01 Jun 2021 07:15) (18 - 20)  SpO2: 99% (01 Jun 2021 07:15) (95% - 100%)  General: NAD.   Carotid bruits absent.     Mental status: The patient is awake, alert, and fully oriented. There is no aphasia. Attention span is normal. Patient is aware of current events.     Cranial nerves: There is no papilledema. Pupils react symmetrically to light. There is no visual field deficit to confrontation. Extraocular motion is full with no nystagmus.  Facial sensation is intact. Facial musculature is symmetric. There is masking of facial expression. Palate elevates symmetrically. Tongue is midline.    Motor: There is some increased tone with cogwheeling at the wrists and elbows with reinforcement.   Strength is 5/5 in the right arm and leg.   Strength is 5/5 in the left arm and leg.    Sensation: Intact to light touch and pin. There is no extinction to double simultaneous stimulation.    Reflexes: 1+ throughout and plantar responses are flexor.    Cerebellar: There is dysmetria on finger to nose testing on the right but not on the left. There is no ataxia on heel-shin testing on either side.    [facility] SS:   Date: 6/1/21  Time:   1a) Level of consciousness (0-3): 0  1b) Questions (0-2): 0  1c) Commands (0-2): 0  2  ) Gaze (0-2): 0  3  ) Visual field (0-3): 0  4  ) Facial palsy (0-3): 0  Motor  5a) Left arm (0-4): 0  5b) Right arm (0-4): 0   6a) Left leg (0-4): 0  6b) Right leg (0-4): 0  7  ) Ataxia (0-2): 1  Sensory  8  ) Sensory (0-2): 0  Speech  9  ) Language (0-3): 0  10) Dysarthria (0-2): 0  Extinction  11) Extinction/inattention (0-2): 0    Total score: 1    Prestroke Modified [place]: 3    (0: No symptoms and no disability.  1: No significant disability despite symptoms; able to carry out all usual duties and activities.  2: Slight disability; unable to carry out all previous activity but able to look after own affairs without assistance.  3: Moderate disability; requiring some help but able to walk without assistance.   4: Moderately severe disability; unable to walk without assistance and unable to attend to own bodily needs without assistance.  5: Severe disability; bedridden, incontinent and requiring constant nursing care and attention.   6: Dead. )     LABS:                         11.8   6.04  )-----------( 206      ( 31 May 2021 21:54 )             36.6       06-01    143  |  104  |  14.7  ----------------------------<  76  4.0   |  30.0<H>  |  0.93    Ca    9.4      01 Jun 2021 02:40    TPro  7.4  /  Alb  4.0  /  TBili  <0.2<L>  /  DBili  x   /  AST  14  /  ALT  <5  /  AlkPhos  88  05-31      PT/INR - ( 31 May 2021 21:54 )   PT: 12.7 sec;   INR: 1.10 ratio    PTT - ( 31 May 2021 21:54 )  PTT:27.9 sec    RADIOLOGY   CT head images reviewed (and concur with report): There is no acute pathology.     CT-A head and neck negative for stenosis/LVO.   CT-P negative.

## 2021-06-01 NOTE — OCCUPATIONAL THERAPY INITIAL EVALUATION ADULT - PRECAUTIONS/LIMITATIONS, REHAB EVAL
head of bed 30 degrees, supervision with meals/aspiration precautions/fall precautions/seizure precautions

## 2021-06-01 NOTE — H&P ADULT - NSICDXPASTMEDICALHX_GEN_ALL_CORE_FT
PAST MEDICAL HISTORY:  Glaucoma     History of BPH     HTN (hypertension)     Parkinson disease

## 2021-06-02 PROBLEM — Z00.00 ENCOUNTER FOR PREVENTIVE HEALTH EXAMINATION: Status: ACTIVE | Noted: 2021-06-02

## 2021-06-02 LAB
ANION GAP SERPL CALC-SCNC: 11 MMOL/L — SIGNIFICANT CHANGE UP (ref 5–17)
BUN SERPL-MCNC: 12.1 MG/DL — SIGNIFICANT CHANGE UP (ref 8–20)
CALCIUM SERPL-MCNC: 9.4 MG/DL — SIGNIFICANT CHANGE UP (ref 8.6–10.2)
CHLORIDE SERPL-SCNC: 108 MMOL/L — HIGH (ref 98–107)
CHOLEST SERPL-MCNC: 172 MG/DL — SIGNIFICANT CHANGE UP
CO2 SERPL-SCNC: 25 MMOL/L — SIGNIFICANT CHANGE UP (ref 22–29)
COVID-19 SPIKE DOMAIN AB INTERP: POSITIVE
COVID-19 SPIKE DOMAIN ANTIBODY RESULT: >250 U/ML — HIGH
CREAT SERPL-MCNC: 1.09 MG/DL — SIGNIFICANT CHANGE UP (ref 0.5–1.3)
GLUCOSE SERPL-MCNC: 83 MG/DL — SIGNIFICANT CHANGE UP (ref 70–99)
HCV AB S/CO SERPL IA: 0.35 S/CO — SIGNIFICANT CHANGE UP (ref 0–0.99)
HCV AB SERPL-IMP: SIGNIFICANT CHANGE UP
HDLC SERPL-MCNC: 78 MG/DL — SIGNIFICANT CHANGE UP
LIPID PNL WITH DIRECT LDL SERPL: 85 MG/DL — SIGNIFICANT CHANGE UP
NON HDL CHOLESTEROL: 94 MG/DL — SIGNIFICANT CHANGE UP
POTASSIUM SERPL-MCNC: 3.8 MMOL/L — SIGNIFICANT CHANGE UP (ref 3.5–5.3)
POTASSIUM SERPL-SCNC: 3.8 MMOL/L — SIGNIFICANT CHANGE UP (ref 3.5–5.3)
SARS-COV-2 IGG+IGM SERPL QL IA: >250 U/ML — HIGH
SARS-COV-2 IGG+IGM SERPL QL IA: POSITIVE
SODIUM SERPL-SCNC: 144 MMOL/L — SIGNIFICANT CHANGE UP (ref 135–145)
TRIGL SERPL-MCNC: 47 MG/DL — SIGNIFICANT CHANGE UP

## 2021-06-02 PROCEDURE — 99232 SBSQ HOSP IP/OBS MODERATE 35: CPT

## 2021-06-02 PROCEDURE — 74230 X-RAY XM SWLNG FUNCJ C+: CPT | Mod: 26

## 2021-06-02 PROCEDURE — 99233 SBSQ HOSP IP/OBS HIGH 50: CPT

## 2021-06-02 RX ADMIN — ATORVASTATIN CALCIUM 80 MILLIGRAM(S): 80 TABLET, FILM COATED ORAL at 22:14

## 2021-06-02 RX ADMIN — ACETAZOLAMIDE 250 MILLIGRAM(S): 250 TABLET ORAL at 22:14

## 2021-06-02 RX ADMIN — CARBIDOPA AND LEVODOPA 2 TABLET(S): 25; 100 TABLET ORAL at 12:08

## 2021-06-02 RX ADMIN — Medication 1 DROP(S): at 05:30

## 2021-06-02 RX ADMIN — Medication 81 MILLIGRAM(S): at 12:08

## 2021-06-02 RX ADMIN — Medication 1 DROP(S): at 22:15

## 2021-06-02 RX ADMIN — LATANOPROST 1 DROP(S): 0.05 SOLUTION/ DROPS OPHTHALMIC; TOPICAL at 22:15

## 2021-06-02 RX ADMIN — SELEGILINE HYDROCHLORIDE 5 MILLIGRAM(S): 1.25 TABLET, ORALLY DISINTEGRATING ORAL at 17:15

## 2021-06-02 RX ADMIN — CARBIDOPA AND LEVODOPA 1.5 TABLET(S): 25; 100 TABLET ORAL at 22:15

## 2021-06-02 RX ADMIN — HEPARIN SODIUM 5000 UNIT(S): 5000 INJECTION INTRAVENOUS; SUBCUTANEOUS at 17:15

## 2021-06-02 RX ADMIN — TAMSULOSIN HYDROCHLORIDE 0.4 MILLIGRAM(S): 0.4 CAPSULE ORAL at 22:14

## 2021-06-02 RX ADMIN — Medication 100 MILLIGRAM(S): at 05:29

## 2021-06-02 RX ADMIN — Medication 1 DROP(S): at 05:29

## 2021-06-02 RX ADMIN — BRIMONIDINE TARTRATE 1 DROP(S): 2 SOLUTION/ DROPS OPHTHALMIC at 05:29

## 2021-06-02 RX ADMIN — BRIMONIDINE TARTRATE 1 DROP(S): 2 SOLUTION/ DROPS OPHTHALMIC at 12:08

## 2021-06-02 RX ADMIN — HEPARIN SODIUM 5000 UNIT(S): 5000 INJECTION INTRAVENOUS; SUBCUTANEOUS at 05:29

## 2021-06-02 RX ADMIN — Medication 1 DROP(S): at 12:15

## 2021-06-02 RX ADMIN — BRIMONIDINE TARTRATE 1 DROP(S): 2 SOLUTION/ DROPS OPHTHALMIC at 22:27

## 2021-06-02 RX ADMIN — SELEGILINE HYDROCHLORIDE 5 MILLIGRAM(S): 1.25 TABLET, ORALLY DISINTEGRATING ORAL at 05:29

## 2021-06-02 RX ADMIN — CARBIDOPA AND LEVODOPA 1.5 TABLET(S): 25; 100 TABLET ORAL at 17:15

## 2021-06-02 NOTE — SWALLOW VFSS/MBS ASSESSMENT ADULT - RECOMMENDED CONSISTENCY
This ?able structure in hypopharynx could potentially be negatively impacting pharyngeal phase of swallow, & causing penetration/aspiration of trials presented.     Downgrade to NPO w/consideration for short-term non-oral alternative means of nutrition/hydration, as per Pt/family wishes  Non-oral meds  Aspiration precautions  Oral care  Rx ENT/GI consult for evaluation/management of ?fibromuscular polyp observed by radiologist on recent esophagram 5/3/21, which could be negatively impacting pharyngeal phase of swallow  Rx MD & radiologist review of imaging from esophagram 5/3/21 & imaging from MBSV today 6/2/21, to determine most appropriate further imaging, if medically warranted   SLP to follow for initiation of traditional dysphagia tx, as schedule permits To note, esophagram completed on 5/3/21, w/radiologist reporting, "Apparent filling defect in the hypopharynx, differential diagnosis includes fibromuscular polyp. Recommend follow-up as clinically endoscopy or CT of neck with oral and IV contrast can be ordered as clinically indicated".  This may be negatively impacting pharyngeal phase of swallow, & causing penetration/aspiration of trials presented, therefore rx MD & radiologist review of imaging from esophagram 5/3/21, & MBSV from today 6/2/21.     Downgrade to NPO w/consideration for short-term non-oral alternative means of nutrition/hydration, as per Pt/family wishes  Non-oral meds  Aspiration precautions  Oral care  Rx ENT/GI consult for further assessment of above stated findings   SLP to follow for initiation of traditional dysphagia tx, as schedule permits

## 2021-06-02 NOTE — SWALLOW VFSS/MBS ASSESSMENT ADULT - DIAGNOSTIC IMPRESSIONS
Pt presents w/mod-severe pharyngeal dysphagia, & overall functional oral phase of swallow. Oral stage characterized by adequate PO acceptance via utensil, w/functional bolus formation/cohesion/propulsion, across all consistencies administered puree, honey, nectar. Noted premature pharyngeal entry observed to the oropharynx w/all PO, however deemed generally WFL. No anterior loss or oral stasis visualized on study.    Pharyngeal phase of swallow marked by poor epiglottic deflection, reduced pharyngeal contractility, & decreased UES opening, resulting in poor bolus passage through pharyngeal space, resulting in mod-severe vallecular residue following all PO trials. Suspect Pt to be w/sensation of stasis, as independent subsequent dry swallows ~ x6-8, executed for each trial, w/eventual fair/fair-good clearance noted. Additional reduced hyo-laryngeal protrusion, & overall upper/lower airway protection, as Pt w/silent aspiration of nectar thick liquids, during & following the swallow, via tsp x 2/2. Additional intermittent penetration during & following the swallow, in ~75% of trials, of all trials puree, honey, nectar, noted above & to the level of the vocal cords, without independent clearance. Pt cued for effortful expectoration, w/good immediate benefit noted (Pt able to clear penetrated/aspirated material from laryngeal vestibule), however ongoing penetration noted for remainder of study leaves Pt at overall high risk. Compensatory postural strategies unable to be executed by Pt given rigidity, therefore chin tuck, L & R head turn all deemed ineffective.  To note, esophagram completed on 5/3/21, w/radiologist reporting, "Apparent filling defect in the hypopharynx, differential diagnosis includes fibromuscular polyp. Recommend follow-up as clinically endoscopy or CT of neck with oral and IV contrast can be ordered as clinically indicated".  CONTINUE BELOW Pt presents w/mod-severe pharyngeal dysphagia, & overall functional oral phase of swallow. Oral stage characterized by adequate PO acceptance via utensil, w/functional bolus formation/cohesion/propulsion, across all consistencies administered puree, honey, nectar. Noted premature pharyngeal entry observed to the oropharynx w/all PO, however deemed generally WFL. No anterior loss or oral stasis visualized on study.    Pharyngeal phase of swallow marked by poor epiglottic deflection, reduced pharyngeal contractility, & decreased UES opening, resulting in poor bolus passage through pharyngeal space, subsequent mod-severe vallecular residue following all PO trials. Suspect Pt to be w/sensation of stasis, as independent subsequent dry swallows ~ x6-8, executed for each trial, w/eventual fair/fair-good clearance noted. Additional reduced hyo-laryngeal protrusion, & overall upper/lower airway protection, as Pt w/silent aspiration of nectar thick liquids, during & following the swallow, via tsp x 2/2. Additional intermittent penetration during & following the swallow, in ~75% of trials, of all trials puree, honey, nectar, noted above & to the level of the vocal cords, without independent clearance. Pt cued for effortful expectoration, w/good immediate benefit noted (Pt able to clear penetrated/aspirated material from laryngeal vestibule), however ongoing penetration noted for remainder of study leaves Pt at overall high risk. Compensatory postural strategies unable to be executed by Pt given cervical rigidity, therefore chin tuck, L & R head turn all deemed ineffective.

## 2021-06-02 NOTE — PROGRESS NOTE ADULT - ASSESSMENT
75y/oM PMH Parkinsons dx, HTN, glaucoma, BPH admitted for r/o cva     CVA   -e0mlnut checks  -LDL: 81  -cont asa, statin   -neuro recs appreciated  -CTH, CTA head neg for acute infarct or hemorrhage, neg for large vessel occlusion   -MRI pending  -[facility] 6/2: npo; as per esophagram 5/3, filling defect in hypopharynx, poss fibromuscular polyp  -PT: home vs [facility]    Hypoglycemia   -hgba1c 5.5  -not on DM meds, no glycopenic symptoms, no change with dextrose IV, likely false readings; glucose wnl on repeat bmp   -cont to monitor     HTN  -norvasc, toprol     Parkinson Disease   -fall risk protocol   -PT eval: home vs claudia   -cont home meds     Glaucoma   -cont eye drops     BPH   -cont flomax

## 2021-06-02 NOTE — SWALLOW VFSS/MBS ASSESSMENT ADULT - COMMENTS
Discussed hx at length with Pt, reporting Parkinson's dx in 2016, w/progressive dysphagia w/thin liquid intake since, though did not seek intervention until early May. Pt reports recent ENT laryngoscopy ~1 week PTA, which was WFL, however MD rx thickened liquids prophylactically, w/completion of MBSV to r/o aspiration. Pt scheduled for outpatient MBSV @ [facility] on 6/28/21. Recent esophagram completed 5/3/21, results, "Apparent filling defect in the hypopharynx, differential diagnosis includes fibromuscular polyp. Recommend follow-up as clinically endoscopy or CT of neck with oral and IV contrast can be ordered as clinically indicated. Slightly delayed oral phase, patient has parkinsonism. Follow-up modified barium swallowing study can be ordered as clinically indicated".

## 2021-06-02 NOTE — PROGRESS NOTE ADULT - ASSESSMENT
The patient is a 75y Male with baseline Parkinson's disease now with new difficulty with right hand.     Possible stroke.   Acute onset right hand difficulty with clumsy hand on exam.   Await MRI brain  LDL: 81  Goal: < 70  Continue antiplatelet and statin.   Mobilize with physical and occupational therapy.     Parkinson's disease   Continue Sinemet and Selegiline.   PT/OT as above.   Appreciate swallow evaluation.     Discharge planning.   May need rehab.   He should follow up with his regular neurologist after discharge.     will follow with you    Richmond Barr MD PhD   [id number]

## 2021-06-02 NOTE — PROGRESS NOTE ADULT - SUBJECTIVE AND OBJECTIVE BOX
ANASTACIO RAMEY    [id number]    75y      Male    CC: r hand weakness    INTERVAL HPI/OVERNIGHT EVENTS: pt seen and examined. reports improvement today    REVIEW OF SYSTEMS:    CONSTITUTIONAL: No fever, weight loss  RESPIRATORY: No cough, wheezing, hemoptysis; No shortness of breath  CARDIOVASCULAR: No chest pain, palpitations  GASTROINTESTINAL: No abdominal or epigastric pain. No nausea, vomiting  NEUROLOGICAL: No headaches    Vital Signs Last 24 Hrs  T(C): 36.4 (02 Jun 2021 11:08), Max: 36.7 (02 Jun 2021 00:29)  T(F): 97.6 (02 Jun 2021 11:08), Max: 98.1 (02 Jun 2021 00:29)  HR: 59 (02 Jun 2021 11:08) (54 - 82)  BP: 160/85 (02 Jun 2021 11:08) (113/67 - 160/85)  BP(mean): --  RR: 18 (02 Jun 2021 11:08) (18 - 18)  SpO2: 99% (02 Jun 2021 11:08) (95% - 99%)    PHYSICAL EXAM:    GENERAL: NAD  HEENT: PERRL, +EOMI  NECK: soft, supple  CHEST/LUNG: Clear to auscultation bilaterally; No wheezing  HEART: S1S2+, Regular rate and rhythm  ABDOMEN: Soft, Nontender, Nondistended; Bowel sounds present  SKIN: warm, dry  NEURO: AAOX3, +RUE dysmetria  PSYCH: normal affect    LABS:                        11.8   6.04  )-----------( 206      ( 31 May 2021 21:54 )             36.6     06-02    144  |  108<H>  |  12.1  ----------------------------<  83  3.8   |  25.0  |  1.09    Ca    9.4      02 Jun 2021 09:01    TPro  7.4  /  Alb  4.0  /  TBili  <0.2<L>  /  DBili  x   /  AST  14  /  ALT  <5  /  AlkPhos  88  05-31    PT/INR - ( 31 May 2021 21:54 )   PT: 12.7 sec;   INR: 1.10 ratio         PTT - ( 31 May 2021 21:54 )  PTT:27.9 sec        MEDICATIONS  (STANDING):  acetaZOLAMIDE    Tablet 250 milliGRAM(s) Oral at bedtime  aspirin  chewable 81 milliGRAM(s) Oral daily  atorvastatin 80 milliGRAM(s) Oral at bedtime  brimonidine 0.2% Ophthalmic Solution 1 Drop(s) Both EYES three times a day  carbidopa/levodopa  25/100 2 Tablet(s) Oral daily  carbidopa/levodopa  25/100 1.5 Tablet(s) Oral <User Schedule>  carbidopa/levodopa  25/100 1.5 Tablet(s) Oral at bedtime  heparin   Injectable 5000 Unit(s) SubCutaneous every 12 hours  latanoprost 0.005% Ophthalmic Solution 1 Drop(s) Both EYES at bedtime  metoprolol succinate  milliGRAM(s) Oral daily  pilocarpine 1% Solution 1 Drop(s) Right EYE three times a day  selegiline Oral Tab/Cap 5 milliGRAM(s) Oral two times a day  tamsulosin 0.4 milliGRAM(s) Oral at bedtime  timolol 0.5% Solution 1 Drop(s) Both EYES two times a day    MEDICATIONS  (PRN):      RADIOLOGY & ADDITIONAL TESTS:

## 2021-06-02 NOTE — PROGRESS NOTE ADULT - SUBJECTIVE AND OBJECTIVE BOX
[facility]                                        Neurology at [place]                                  Cortney Brito, & Carlos Alberto                                      [address]                                           [place], [place], [address]                                                [phone]        CC: R/o Stroke.     HISTORY:  The patient is a 75y Male with Parkinson's disease who follows with [facility]. He is on Sinemet and Selegiline.   He now presented with difficulty with the right hand.   He was eating and noted that he was having difficulty getting the fork to his mouth.   He ended up hitting his right cheek with the food.    He was brought to the ER and the stroke code was activated. He was evaluated by the tele-stroke service.   He was felt not to be a t-PA candidate. (JW)    Interval history: no new symptoms    Review of systems (neurology): Denies headache or dizziness. (+) weakness no numbness.  Denies speech/language deficits. Denies diplopia/blurred vision.  Denies confusion    MEDICATIONS  (STANDING):  acetaZOLAMIDE    Tablet 250 milliGRAM(s) Oral at bedtime  aspirin  chewable 81 milliGRAM(s) Oral daily  atorvastatin 80 milliGRAM(s) Oral at bedtime  brimonidine 0.2% Ophthalmic Solution 1 Drop(s) Both EYES three times a day  carbidopa/levodopa  25/100 2 Tablet(s) Oral daily  carbidopa/levodopa  25/100 1.5 Tablet(s) Oral <User Schedule>  carbidopa/levodopa  25/100 1.5 Tablet(s) Oral at bedtime  heparin   Injectable 5000 Unit(s) SubCutaneous every 12 hours  latanoprost 0.005% Ophthalmic Solution 1 Drop(s) Both EYES at bedtime  metoprolol succinate  milliGRAM(s) Oral daily  pilocarpine 1% Solution 1 Drop(s) Right EYE three times a day  selegiline Oral Tab/Cap 5 milliGRAM(s) Oral two times a day  tamsulosin 0.4 milliGRAM(s) Oral at bedtime  timolol 0.5% Solution 1 Drop(s) Both EYES two times a day    MEDICATIONS  (PRN):      Vital Signs Last 24 Hrs  T(C): 36.4 (02 Jun 2021 11:08), Max: 36.7 (02 Jun 2021 00:29)  T(F): 97.6 (02 Jun 2021 11:08), Max: 98.1 (02 Jun 2021 00:29)  HR: 59 (02 Jun 2021 11:08) (54 - 82)  BP: 160/85 (02 Jun 2021 11:08) (113/67 - 160/85)  BP(mean): --  RR: 18 (02 Jun 2021 11:08) (18 - 18)  SpO2: 99% (02 Jun 2021 11:08) (95% - 99%)    Detailed neuro exam:    Mental status: The patient is awake, alert, and fully oriented. There is no aphasia. Attention span is normal.     Cranial nerves: Pupils react symmetrically to light. There is no visual field deficit to confrontation. Extraocular motion is full with no nystagmus.  Facial sensation is intact. Facial musculature is symmetric. There is masking of facial expression. Palate elevates symmetrically. Tongue is midline.    Motor: There is some increased tone with cogwheeling at the wrists and elbows with reinforcement.   mild diffuse weakness    Sensation: Intact to light touch and pin. There is no extinction to double simultaneous stimulation.    Reflexes: 1+ throughout and plantar responses are flexor.    Cerebellar: There is dysmetria on finger to nose testing on the right but not on the left. There is no ataxia on heel-shin testing on either side.      LABS:                         11.8   6.04  )-----------( 206      ( 31 May 2021 21:54 )             36.6     06-02    144  |  108<H>  |  12.1  ----------------------------<  83  3.8   |  25.0  |  1.09    Ca    9.4      02 Jun 2021 09:01    TPro  7.4  /  Alb  4.0  /  TBili  <0.2<L>  /  DBili  x   /  AST  14  /  ALT  <5  /  AlkPhos  88  05-31    LIVER FUNCTIONS - ( 31 May 2021 21:54 )  Alb: 4.0 g/dL / Pro: 7.4 g/dL / ALK PHOS: 88 U/L / ALT: <5 U/L / AST: 14 U/L / GGT: x           PT/INR - ( 31 May 2021 21:54 )   PT: 12.7 sec;   INR: 1.10 ratio         PTT - ( 31 May 2021 21:54 )  PTT:27.9 sec  RADIOLOGY   CT head no acute CVA, mass or blood  CT-A head and neck negative for stenosis/LVO.   CT Perfusion head - CBF<30% volume 0ml, Tmax>6s volume =0ml

## 2021-06-03 ENCOUNTER — TRANSCRIPTION ENCOUNTER (OUTPATIENT)
Age: 76
End: 2021-06-03

## 2021-06-03 DIAGNOSIS — R13.12 DYSPHAGIA, OROPHARYNGEAL PHASE: ICD-10-CM

## 2021-06-03 LAB
ANION GAP SERPL CALC-SCNC: 13 MMOL/L — SIGNIFICANT CHANGE UP (ref 5–17)
BUN SERPL-MCNC: 14.4 MG/DL — SIGNIFICANT CHANGE UP (ref 8–20)
CALCIUM SERPL-MCNC: 9.4 MG/DL — SIGNIFICANT CHANGE UP (ref 8.6–10.2)
CHLORIDE SERPL-SCNC: 110 MMOL/L — HIGH (ref 98–107)
CO2 SERPL-SCNC: 21 MMOL/L — LOW (ref 22–29)
CREAT SERPL-MCNC: 1.22 MG/DL — SIGNIFICANT CHANGE UP (ref 0.5–1.3)
GLUCOSE BLDC GLUCOMTR-MCNC: 58 MG/DL — LOW (ref 70–99)
GLUCOSE BLDC GLUCOMTR-MCNC: 73 MG/DL — SIGNIFICANT CHANGE UP (ref 70–99)
GLUCOSE BLDC GLUCOMTR-MCNC: 74 MG/DL — SIGNIFICANT CHANGE UP (ref 70–99)
GLUCOSE SERPL-MCNC: 76 MG/DL — SIGNIFICANT CHANGE UP (ref 70–99)
POTASSIUM SERPL-MCNC: 3.9 MMOL/L — SIGNIFICANT CHANGE UP (ref 3.5–5.3)
POTASSIUM SERPL-SCNC: 3.9 MMOL/L — SIGNIFICANT CHANGE UP (ref 3.5–5.3)
SODIUM SERPL-SCNC: 144 MMOL/L — SIGNIFICANT CHANGE UP (ref 135–145)

## 2021-06-03 PROCEDURE — 99223 1ST HOSP IP/OBS HIGH 75: CPT

## 2021-06-03 PROCEDURE — 99232 SBSQ HOSP IP/OBS MODERATE 35: CPT

## 2021-06-03 RX ORDER — SODIUM CHLORIDE 9 MG/ML
1000 INJECTION, SOLUTION INTRAVENOUS
Refills: 0 | Status: DISCONTINUED | OUTPATIENT
Start: 2021-06-03 | End: 2021-06-06

## 2021-06-03 RX ADMIN — Medication 1 DROP(S): at 10:13

## 2021-06-03 RX ADMIN — HEPARIN SODIUM 5000 UNIT(S): 5000 INJECTION INTRAVENOUS; SUBCUTANEOUS at 17:14

## 2021-06-03 RX ADMIN — ACETAZOLAMIDE 250 MILLIGRAM(S): 250 TABLET ORAL at 23:25

## 2021-06-03 RX ADMIN — CARBIDOPA AND LEVODOPA 2 TABLET(S): 25; 100 TABLET ORAL at 12:09

## 2021-06-03 RX ADMIN — BRIMONIDINE TARTRATE 1 DROP(S): 2 SOLUTION/ DROPS OPHTHALMIC at 05:28

## 2021-06-03 RX ADMIN — BRIMONIDINE TARTRATE 1 DROP(S): 2 SOLUTION/ DROPS OPHTHALMIC at 13:39

## 2021-06-03 RX ADMIN — SELEGILINE HYDROCHLORIDE 5 MILLIGRAM(S): 1.25 TABLET, ORALLY DISINTEGRATING ORAL at 05:28

## 2021-06-03 RX ADMIN — ATORVASTATIN CALCIUM 80 MILLIGRAM(S): 80 TABLET, FILM COATED ORAL at 23:24

## 2021-06-03 RX ADMIN — HEPARIN SODIUM 5000 UNIT(S): 5000 INJECTION INTRAVENOUS; SUBCUTANEOUS at 05:27

## 2021-06-03 RX ADMIN — TAMSULOSIN HYDROCHLORIDE 0.4 MILLIGRAM(S): 0.4 CAPSULE ORAL at 23:24

## 2021-06-03 RX ADMIN — Medication 1 DROP(S): at 13:39

## 2021-06-03 RX ADMIN — Medication 81 MILLIGRAM(S): at 12:09

## 2021-06-03 RX ADMIN — Medication 1 DROP(S): at 17:06

## 2021-06-03 RX ADMIN — BRIMONIDINE TARTRATE 1 DROP(S): 2 SOLUTION/ DROPS OPHTHALMIC at 23:25

## 2021-06-03 RX ADMIN — CARBIDOPA AND LEVODOPA 1.5 TABLET(S): 25; 100 TABLET ORAL at 23:25

## 2021-06-03 RX ADMIN — Medication 1 DROP(S): at 05:28

## 2021-06-03 RX ADMIN — SODIUM CHLORIDE 60 MILLILITER(S): 9 INJECTION, SOLUTION INTRAVENOUS at 12:11

## 2021-06-03 RX ADMIN — Medication 1 DROP(S): at 23:25

## 2021-06-03 RX ADMIN — SELEGILINE HYDROCHLORIDE 5 MILLIGRAM(S): 1.25 TABLET, ORALLY DISINTEGRATING ORAL at 17:57

## 2021-06-03 NOTE — DISCHARGE NOTE NURSING/CASE MANAGEMENT/SOCIAL WORK - PATIENT PORTAL LINK FT
You can access the FollowMyHealth Patient Portal offered by [facility] by registering at the following website: [email or web address]. By joining [facility]’s FollowMyHealth portal, you will also be able to view your health information using other applications (apps) compatible with our system.

## 2021-06-03 NOTE — CONSULT NOTE ADULT - PROBLEM SELECTOR RECOMMENDATION 9
probably due to progressive parkinsons and possibly superimposed TIA or small CVA. Suggest ENT evaluation of possible polyp in hypopharynx of esophagram and if temporary feeds are needed would insert dubhoff tube after ENT evaluation for very temporary feeds. May need PEG but family wants to defer for the present. probably due to progressive parkinsons and possibly superimposed TIA or small CVA. Suggest ENT evaluation of possible polyp noted in hypopharynx on esophagram  5/3 and if temporary feeds are needed would insert dubhoff tube after ENT evaluation for very temporary feeds. May need PEG but family wants to defer for the present.

## 2021-06-03 NOTE — DISCHARGE NOTE NURSING/CASE MANAGEMENT/SOCIAL WORK - NSDCFUADDAPPT_GEN_ALL_CORE_FT
once  discharged   ***pt has  a neurology appointment  with Dr Koch on  6/12 at  9:20am*** Pt's meds will be managed at SNF.          ***Pt. has  a neurology appointment  with Dr Rufus Koch  on  6/12 at  9:20am at Address: [address] [place] [address] .   We advise that you do not miss this f/u appointment .   If you have to change the date or time please call   Phone: [phone]

## 2021-06-03 NOTE — CONSULT NOTE ADULT - ASSESSMENT
75M with Parkinsons and recent CVA.  Noted to have Oropharyngeal dysphagia  on MBSS.  Small fibromuscular polyp noted on esophagram 1 month ago.    -MBSS reviewed.  No obvious obstructive lesion noted.    -Normal fiberoptic laryngoscopy today.  No pharyngeal polyp or diverticula noted  -Dysphagia more likely 2/2 progression of Parkinsons and recent CVA  -No ENT intervention at this time  -consider alternate means of feeding.  Follow up Speech and Swallow.  -D/w Primary team.  Reconsult PRN

## 2021-06-03 NOTE — PROGRESS NOTE ADULT - ASSESSMENT
74y/o M PMH Parkinsons dx, HTN, glaucoma, BPH admitted for r/o CVA.     #? CVA   -CTH negative  -CTA head/neck negative   -MRI pending  -TTE WNL  -Cont asa, statin   -Neuro following   -Seen in consult by S[facility]S. [place] [facility] [place] of which recommendations are for NPO. Esophagram was performed on 5/3 as an outpatient which noted a possible filling defect in the hypopharynx/poss fibromuscular polyp. Discussed w/ S&S today who are recommending an ENT and GI eval as pt may need further work up with scope. Possible that hypopharynx defect is worsening patients baseline dysphagia.   -GI and ENT consulted   -PT: home vs [facility]    #Hypoglycemia   -Resolved  -Hgba1c 5.5  -Not on DM meds, no glycopenic symptoms, no change with dextrose IV, likely false readings; glucose wnl on repeat bmp   -Cont to monitor   -Will add IVF w/ D5 while pt remains NPO  -Accu checks Q6 while NPO    #HTN  -Continue toprol      #Parkinson Disease   -Fall risk protocol   -PT eval: home vs claudia   -Cont home meds     #Glaucoma   -Cont eye drops     #BPH   -Cont flomax     DISPO: pending further work up with MRI, ENT and GI eval

## 2021-06-03 NOTE — CONSULT NOTE ADULT - SUBJECTIVE AND OBJECTIVE BOX
Patient is a 75y old  Male who presents with a chief complaint of CVA (03 Jun 2021 10:57)      HPI:  76 y/o male with hx of glaucoma, HTN, Parkinsons who resides at home with his wife. He uses a cane at baseline, and admits to being on dysphagia diet. Patient noted right hand was " not working well around 4pm yesterday." Then around 7pm his wife noted he wasnt putting the fork in the right spot while eating dinner, the food was hitting his cheek. Denies any HA, CP, SOB, palpations, hx of afib. Denies any falls. In the ED patient with CT/A/P without acute findings or LVO. NIH 3. Evaluated by Tele-stroke and not a TPA candidate based on LKW and low NIH. Of note patient with normal glucose on BMP, no hx of DM, and no neuro-glycopenic symptoms, however repeat accu checks with erroneous low readings.  (01 Jun 2021 03:09)      REVIEW OF SYSTEMS:    CONSTITUTIONAL: No fever, weight loss, or fatigue  EYES: No eye pain, visual disturbances, or discharge  ENMT:  No difficulty hearing, tinnitus, vertigo; No sinus or throat pain  NECK: No pain or stiffness  RESPIRATORY: No cough, wheezing, chills or hemoptysis; No shortness of breath  CARDIOVASCULAR: No chest pain, palpitations, dizziness, or leg swelling  GASTROINTESTINAL: No abdominal or epigastric pain. No nausea, vomiting, or hematemesis; No diarrhea or constipation. No melena or hematochezia.  NEUROLOGICAL: No headaches, memory loss, loss of strength, numbness, or tremors  SKIN: No itching, burning, rashes, or lesions   LYMPH NODES: No enlarged glands  MUSCULOSKELETAL: No joint pain or swelling; No muscle, back, or extremity pain  PSYCHIATRIC: No depression, anxiety, mood swings, or difficulty sleeping  HEME/LYMPH: No easy bruising, or bleeding gums  ALLERY AND IMMUNOLOGIC: No hives or eczema      PAST MEDICAL & SURGICAL HISTORY:  Parkinson disease    Glaucoma    HTN (hypertension)    History of BPH    No significant past surgical history        FAMILY HISTORY:  No pertinent family history in first degree relatives        SOCIAL HISTORY:  Smoking Status: [ ] Current, [x ] Former, [ ] Never-quit many years ago  Pack Years:  Alcohol Use: none    Home Medications:  acetaZOLAMIDE 250 mg oral tablet: 1 tab(s) orally once a day (01 Jun 2021 09:07)  aspirin 81 mg oral tablet: 1 tab(s) orally once a day (01 Jun 2021 09:07)  brinzolamide-brimonidine 1%-0.2% ophthalmic suspension: 1 drop(s) to each affected eye 2 times a day (01 Jun 2021 09:07)  Flomax 0.4 mg oral capsule: 1 cap(s) orally once a day (01 Jun 2021 09:07)  pilocarpine 1% ophthalmic solution: 1 drop(s) to each affected eye 3 times a day (01 Jun 2021 09:07)  Rocklatan 0.02%-0.005% ophthalmic solution: 1 drop(s) in each eye once a day (in the evening) (01 Jun 2021 09:07)  selegiline 5 mg oral tablet: 1 tab(s) orally 2 times a day (01 Jun 2021 09:07)  Sinemet 25 mg-100 mg oral tablet: 2 tab(s) orally once a day (01 Jun 2021 09:07)  Sinemet 25 mg-100 mg oral tablet: 1.5 tab(s) orally once a day at 4pm (01 Jun 2021 09:07)  Sinemet 25 mg-100 mg oral tablet: 1.5 tab(s) orally once a day (at bedtime) (01 Jun 2021 09:07)  timolol hemihydrate 0.5% ophthalmic solution: 1 drop(s) to each affected eye 2 times a day (01 Jun 2021 09:07)  Toprol- mg oral tablet, extended release: 1 tab(s) orally once a day (01 Jun 2021 09:07)      MEDICATIONS:  MEDICATIONS  (STANDING):  acetaZOLAMIDE    Tablet 250 milliGRAM(s) Oral at bedtime  aspirin  chewable 81 milliGRAM(s) Oral daily  atorvastatin 80 milliGRAM(s) Oral at bedtime  brimonidine 0.2% Ophthalmic Solution 1 Drop(s) Both EYES three times a day  carbidopa/levodopa  25/100 2 Tablet(s) Oral daily  carbidopa/levodopa  25/100 1.5 Tablet(s) Oral <User Schedule>  carbidopa/levodopa  25/100 1.5 Tablet(s) Oral at bedtime  dextrose 5% + lactated ringers. 1000 milliLiter(s) (60 mL/Hr) IV Continuous <Continuous>  heparin   Injectable 5000 Unit(s) SubCutaneous every 12 hours  latanoprost 0.005% Ophthalmic Solution 1 Drop(s) Both EYES at bedtime  metoprolol succinate  milliGRAM(s) Oral daily  pilocarpine 1% Solution 1 Drop(s) Right EYE three times a day  selegiline Oral Tab/Cap 5 milliGRAM(s) Oral two times a day  tamsulosin 0.4 milliGRAM(s) Oral at bedtime  timolol 0.5% Solution 1 Drop(s) Both EYES two times a day    MEDICATIONS  (PRN):      Allergies    No Known Allergies    Intolerances        Vital Signs Last 24 Hrs  T(C): 36.8 (03 Jun 2021 07:49), Max: 36.8 (03 Jun 2021 07:49)  T(F): 98.2 (03 Jun 2021 07:49), Max: 98.2 (03 Jun 2021 07:49)  HR: 67 (03 Jun 2021 07:49) (58 - 78)  BP: 124/70 (03 Jun 2021 07:49) (124/68 - 159/75)  BP(mean): --  RR: 19 (03 Jun 2021 07:49) (16 - 19)  SpO2: 99% (03 Jun 2021 07:49) (98% - 99%)        PHYSICAL EXAM:    General: thin male, sitting up in [place], awake and alert but difficult to understand due to some accent and some dysarthria but in no acute distress  HEENT: MMM, conjunctiva and sclera clear  Lungs: Clear  Heart: Rhythm Regular, No Murmurs  Gastrointestinal: Soft, non-tender non-distended; Normal bowel sounds; No rebound or guarding; No Organomegaly & No Masses  Extremities: Normal range of motion, No clubbing, cyanosis or edema  Neurological: Alert and oriented x3, Non-focal  Skin: Warm and dry. No obvious rash        LABS:    06-03    144  |  110<H>  |  14.4  ----------------------------<  76  3.9   |  21.0<L>  |  1.22    Ca    9.4      03 Jun 2021 07:33            RADIOLOGY & ADDITIONAL STUDIES:    < from: CT Angio Head w/ IV Cont (05.31.21 @ 22:05) >   EXAM:  CT ANGIO BRAIN (W)AW IC                         EXAM:  CT ANGIO NECK (W)AW IC                         EXAM:  CT PERFUSION W MAPS IC                          PROCEDURE DATE:  05/31/2021          INTERPRETATION:  HISTORY:  Acute onset right-sided weakness, stroke alert, history of Parkinson's disease    COMPARISON: None    TECHNIQUE: CT angiogram of the neck and brain was performed after administration of intravenous contrast. MIP and 3D reconstructions were performed.    Perfusion CT through the brain obtained during separate bolus injection of intravenous contrast.  Processing of the CT perfusion data, specifically, maps of mean transit time, cerebral blood flow and cerebral blood volume were generated using i[employer] RAPID.    Total of 120 cc Omnipaque 350 intravenous contrast were administered without complication. 0 cc intravenous contrast discarded.    FINDINGS:    CT PERFUSION:    CBF <30%: 0 ml  Tmax > 6s: 0 ml  Mismatch volume=  0 ml    At the imaged levels, normal meantransit time, cerebral blood flow, and cerebral blood volume are demonstrated bilaterally. There is no evidence of irreversible, potentially reversible, or compensated perfusion abnormality within these portions of the brain.      CTA BRAIN    INTERNAL CAROTID ARTERIES:  The intracranial segments of the ICA are patent without hemodynamically significant stenosis, occlusion, or aneurysm. The ICA bifurcations are unremarkable.    ANTERIOR CEREBRAL ARTERIES: No flow-limiting stenosis or occlusion. Anterior communicating artery is unremarkable without aneurysm.    MIDDLE CEREBRAL ARTERIES: No flow-limiting stenosis or occlusion. MCA bifurcations are unremarkable without aneurysm.    POSTERIOR CEREBRAL ARTERIES: No flow-limiting stenosis or occlusion. Posterior communicating arteries are not well seen bilaterally.    VERTEBROBASILAR SYSTEM: No flow-limiting stenosis or occlusion. Basilar tip is unremarkable.    CTA NECK    RIGHT CAROTID SYSTEM: Normal in course and caliber without flow-limiting stenosis or occlusion.    LEFT CAROTID SYSTEM: Normal in course and caliber without flow-limiting stenosis or occlusion.    VERTEBRAL SYSTEM:  Normal in course and caliber  without flow-limiting stenosis or occlusion. Origin of the vertebral arteries are unremarkable.    AORTIC ARCH: Origin of the great vessels are unremarkable.    IMPRESSION:    CT PERFUSION: Normal perfusion..    CTA BRAIN: Patent intracranial circulation. No flow-limiting stenosis or occlusion.    CTA NECK: Patent cervical vasculature. No flow limiting stenosis or occlusion.            KARISSA HAMMONDS MD; Attending Radiologist  This document has been electronically signed. May 31 2021 10:23PM    < end of copied text >  < from: CT Brain Stroke Protocol (05.31.21 @ 21:57) >   EXAM:  CT BRAIN STROKE PROTOCOL                          PROCEDURE DATE:  05/31/2021          INTERPRETATION:  EXAMINATION: CT HEAD STROKE PROTOCOL    DATE: 5/31/2021 9:57 PM    INDICATION: Acute onset right-sided weakness, stroke code, history ofParkinson's disease    COMPARISON: None available.    TECHNIQUE: Thin section noncontrast axial images were obtained through the head. RAPID AI was utilized for preliminary assessment of intracranial hemorrhage.  .    FINDINGS:  Intracranial Contents:    The density of the brain is appropriate for age. Gray-white differentiation is preserved. The ventricles and sulci are normal in size and configuration. The basilar cisterns are clear. No focal edema or evidence of acute mass effect. There is no intracranial fluid collection or acute hemorrhage.    Bones and Extracranial Soft Tissues:    There is no fracture identified. The visualized paranasal sinuses have minimal mucosal thickening and the mastoid air cells are clear. Scalp and imaged facial soft tissues are within normal limits.      IMPRESSION:  1. Unremarkable, unenhanced CT head.    Critical results relayed to Dr. Payne at 10:06 P    < end of copied text >    Patient is a 75y old  Male who presents with a chief complaint of CVA (03 Jun 2021 10:57)      HPI:  76 y/o male with hx of glaucoma, HTN, Parkinsons who resides at home with his wife. He uses a cane at baseline, and admits to being on dysphagia diet. Patient noted right hand was " not working well around 4pm yesterday." Then around 7pm his wife noted he wasnt putting the fork in the right spot while eating dinner, the food was hitting his cheek. Denies any HA, CP, SOB, palpations, hx of afib. Denies any falls. In the ED patient with CT/A/P without acute findings or LVO. NIH 3. Evaluated by Tele-stroke and not a TPA candidate based on LKW and low NIH. Of note patient with normal glucose on BMP, no hx of DM, and no neuro-glycopenic symptoms, however repeat accu checks with erroneous low readings.  (01 Jun 2021 03:09)    He has hx of  Parkinson's dx in 2016, w/progressive dysphagia w/thin liquid intake since, though did not seek intervention until early May. Pt reports recent ENT laryngoscopy ~1 week PTA, which was WFL, however MD rx thickened liquids prophylactically, w/completion of MBSV to r/o aspiration. Pt scheduled for outpatient MBSV @ [facility] on 6/28/21. Recent esophagram completed 5/3/21, results, "Apparent filling defect in the hypopharynx, differential diagnosis includes fibromuscular polyp. Recommend follow-up as clinically endoscopy or CT of neck with oral and IV contrast can be ordered as clinically indicated. Slightly delayed oral phase, patient has parkinsonism. Follow-up modified barium swallowing study can be ordered as clinically indicated".    Yesterday MBS c/w moderate to severe oropharyngeal dysphagia and that he should be NPO at least temporarily with alternate means of nutrition and should have further evaluation of defect in hypopharynx. At this time family does note that he has trouble with liquids and frequently coughs while eating. However at this time neither he or family wants to proceed with PEG. MRI of brain pending. he has no GI complaints    REVIEW OF SYSTEMS:    CONSTITUTIONAL: No fever, weight loss, or fatigue  EYES: No eye pain, visual disturbances, or discharge  ENMT:  No difficulty hearing, tinnitus, vertigo; No sinus or throat pain  NECK: No pain or stiffness  RESPIRATORY: No cough, wheezing, chills or hemoptysis; No shortness of breath  CARDIOVASCULAR: No chest pain, palpitations, dizziness, or leg swelling  GASTROINTESTINAL: No abdominal or epigastric pain. No nausea, vomiting, or hematemesis; No diarrhea or constipation. No melena or hematochezia.  NEUROLOGICAL: as above  SKIN: No itching, burning, rashes, or lesions   LYMPH NODES: No enlarged glands  MUSCULOSKELETAL: No joint pain or swelling; No muscle, back, or extremity pain  PSYCHIATRIC: No depression, anxiety, mood swings, or difficulty sleeping  HEME/LYMPH: No easy bruising, or bleeding gums  ALLERY AND IMMUNOLOGIC: No hives or eczema      PAST MEDICAL & SURGICAL HISTORY:  Parkinson disease    Glaucoma    HTN (hypertension)    History of BPH    No significant past surgical history        FAMILY HISTORY:  No pertinent family history in first degree relatives        SOCIAL HISTORY:  Smoking Status: [ ] Current, [x ] Former, [ ] Never-quit many years ago  Pack Years:  Alcohol Use: none    Home Medications:  acetaZOLAMIDE 250 mg oral tablet: 1 tab(s) orally once a day (01 Jun 2021 09:07)  aspirin 81 mg oral tablet: 1 tab(s) orally once a day (01 Jun 2021 09:07)  brinzolamide-brimonidine 1%-0.2% ophthalmic suspension: 1 drop(s) to each affected eye 2 times a day (01 Jun 2021 09:07)  Flomax 0.4 mg oral capsule: 1 cap(s) orally once a day (01 Jun 2021 09:07)  pilocarpine 1% ophthalmic solution: 1 drop(s) to each affected eye 3 times a day (01 Jun 2021 09:07)  Rocklatan 0.02%-0.005% ophthalmic solution: 1 drop(s) in each eye once a day (in the evening) (01 Jun 2021 09:07)  selegiline 5 mg oral tablet: 1 tab(s) orally 2 times a day (01 Jun 2021 09:07)  Sinemet 25 mg-100 mg oral tablet: 2 tab(s) orally once a day (01 Jun 2021 09:07)  Sinemet 25 mg-100 mg oral tablet: 1.5 tab(s) orally once a day at 4pm (01 Jun 2021 09:07)  Sinemet 25 mg-100 mg oral tablet: 1.5 tab(s) orally once a day (at bedtime) (01 Jun 2021 09:07)  timolol hemihydrate 0.5% ophthalmic solution: 1 drop(s) to each affected eye 2 times a day (01 Jun 2021 09:07)  Toprol- mg oral tablet, extended release: 1 tab(s) orally once a day (01 Jun 2021 09:07)      MEDICATIONS:  MEDICATIONS  (STANDING):  acetaZOLAMIDE    Tablet 250 milliGRAM(s) Oral at bedtime  aspirin  chewable 81 milliGRAM(s) Oral daily  atorvastatin 80 milliGRAM(s) Oral at bedtime  brimonidine 0.2% Ophthalmic Solution 1 Drop(s) Both EYES three times a day  carbidopa/levodopa  25/100 2 Tablet(s) Oral daily  carbidopa/levodopa  25/100 1.5 Tablet(s) Oral <User Schedule>  carbidopa/levodopa  25/100 1.5 Tablet(s) Oral at bedtime  dextrose 5% + lactated ringers. 1000 milliLiter(s) (60 mL/Hr) IV Continuous <Continuous>  heparin   Injectable 5000 Unit(s) SubCutaneous every 12 hours  latanoprost 0.005% Ophthalmic Solution 1 Drop(s) Both EYES at bedtime  metoprolol succinate  milliGRAM(s) Oral daily  pilocarpine 1% Solution 1 Drop(s) Right EYE three times a day  selegiline Oral Tab/Cap 5 milliGRAM(s) Oral two times a day  tamsulosin 0.4 milliGRAM(s) Oral at bedtime  timolol 0.5% Solution 1 Drop(s) Both EYES two times a day    MEDICATIONS  (PRN):      Allergies    No Known Allergies    Intolerances        Vital Signs Last 24 Hrs  T(C): 36.8 (03 Jun 2021 07:49), Max: 36.8 (03 Jun 2021 07:49)  T(F): 98.2 (03 Jun 2021 07:49), Max: 98.2 (03 Jun 2021 07:49)  HR: 67 (03 Jun 2021 07:49) (58 - 78)  BP: 124/70 (03 Jun 2021 07:49) (124/68 - 159/75)  BP(mean): --  RR: 19 (03 Jun 2021 07:49) (16 - 19)  SpO2: 99% (03 Jun 2021 07:49) (98% - 99%)        PHYSICAL EXAM:    General: thin male, sitting up in [place], awake and alert but difficult to understand due to some accent and some dysarthria but in no acute distress  HEENT: MMM, conjunctiva and sclera clear  Lungs: Clear  Heart: Rhythm Regular, No Murmurs  Gastrointestinal: Soft, non-tender non-distended; Normal bowel sounds; No rebound or guarding; No Organomegaly & No Masses  Extremities: Normal range of motion, No clubbing, cyanosis or edema  Neurological: Alert and oriented x3, Non-focal  Skin: Warm and dry. No obvious rash        LABS:    06-03    144  |  110<H>  |  14.4  ----------------------------<  76  3.9   |  21.0<L>  |  1.22    Ca    9.4      03 Jun 2021 07:33            RADIOLOGY & ADDITIONAL STUDIES:    < from: CT Angio Head w/ IV Cont (05.31.21 @ 22:05) >   EXAM:  CT ANGIO BRAIN (W)AW IC                         EXAM:  CT ANGIO NECK (W)AW IC                         EXAM:  CT PERFUSION W MAPS IC                          PROCEDURE DATE:  05/31/2021          INTERPRETATION:  HISTORY:  Acute onset right-sided weakness, stroke alert, history of Parkinson's disease    COMPARISON: None    TECHNIQUE: CT angiogram of the neck and brain was performed after administration of intravenous contrast. MIP and 3D reconstructions were performed.    Perfusion CT through the brain obtained during separate bolus injection of intravenous contrast.  Processing of the CT perfusion data, specifically, maps of mean transit time, cerebral blood flow and cerebral blood volume were generated using [employer] RAPID.    Total of 120 cc Omnipaque 350 intravenous contrast were administered without complication. 0 cc intravenous contrast discarded.    FINDINGS:    CT PERFUSION:    CBF <30%: 0 ml  Tmax > 6s: 0 ml  Mismatch volume=  0 ml    At the imaged levels, normal meantransit time, cerebral blood flow, and cerebral blood volume are demonstrated bilaterally. There is no evidence of irreversible, potentially reversible, or compensated perfusion abnormality within these portions of the brain.      CTA BRAIN    INTERNAL CAROTID ARTERIES:  The intracranial segments of the ICA are patent without hemodynamically significant stenosis, occlusion, or aneurysm. The ICA bifurcations are unremarkable.    ANTERIOR CEREBRAL ARTERIES: No flow-limiting stenosis or occlusion. Anterior communicating artery is unremarkable without aneurysm.    MIDDLE CEREBRAL ARTERIES: No flow-limiting stenosis or occlusion. MCA bifurcations are unremarkable without aneurysm.    POSTERIOR CEREBRAL ARTERIES: No flow-limiting stenosis or occlusion. Posterior communicating arteries are not well seen bilaterally.    VERTEBROBASILAR SYSTEM: No flow-limiting stenosis or occlusion. Basilar tip is unremarkable.    CTA NECK    RIGHT CAROTID SYSTEM: Normal in course and caliber without flow-limiting stenosis or occlusion.    LEFT CAROTID SYSTEM: Normal in course and caliber without flow-limiting stenosis or occlusion.    VERTEBRAL SYSTEM:  Normal in course and caliber  without flow-limiting stenosis or occlusion. Origin of the vertebral arteries are unremarkable.    AORTIC ARCH: Origin of the great vessels are unremarkable.    IMPRESSION:    CT PERFUSION: Normal perfusion..    CTA BRAIN: Patent intracranial circulation. No flow-limiting stenosis or occlusion.    CTA NECK: Patent cervical vasculature. No flow limiting stenosis or occlusion.            KARISSA HAMMONDS MD; Attending Radiologist  This document has been electronically signed. May 31 2021 10:23PM    < end of copied text >  < from: CT Brain Stroke Protocol (05.31.21 @ 21:57) >   EXAM:  CT BRAIN STROKE PROTOCOL                          PROCEDURE DATE:  05/31/2021          INTERPRETATION:  EXAMINATION: CT HEAD STROKE PROTOCOL    DATE: 5/31/2021 9:57 PM    INDICATION: Acute onset right-sided weakness, stroke code, history ofParkinson's disease    COMPARISON: None available.    TECHNIQUE: Thin section noncontrast axial images were obtained through the head. RAPID AI was utilized for preliminary assessment of intracranial hemorrhage.  .    FINDINGS:  Intracranial Contents:    The density of the brain is appropriate for age. Gray-white differentiation is preserved. The ventricles and sulci are normal in size and configuration. The basilar cisterns are clear. No focal edema or evidence of acute mass effect. There is no intracranial fluid collection or acute hemorrhage.    Bones and Extracranial Soft Tissues:    There is no fracture identified. The visualized paranasal sinuses have minimal mucosal thickening and the mastoid air cells are clear. Scalp and imaged facial soft tissues are within normal limits.      IMPRESSION:  1. Unremarkable, unenhanced CT head.    Critical results relayed to Dr. Payne at 10:06 P    < end of copied text >    Patient is a 75y old  Male who presents with a chief complaint of CVA (03 Jun 2021 10:57)      HPI:  76 y/o male with hx of glaucoma, HTN, Parkinsons who resides at home with his wife. He uses a cane at baseline, and admits to being on dysphagia diet. Patient noted right hand was " not working well around 4pm yesterday." Then around 7pm his wife noted he wasnt putting the fork in the right spot while eating dinner, the food was hitting his cheek. Denies any HA, CP, SOB, palpations, hx of afib. Denies any falls. In the ED patient with CT/A/P without acute findings or LVO. NIH 3. Evaluated by Tele-stroke and not a TPA candidate based on LKW and low NIH. Of note patient with normal glucose on BMP, no hx of DM, and no neuro-glycopenic symptoms, however repeat accu checks with erroneous low readings.  (01 Jun 2021 03:09)    He has hx of  Parkinson's dx in 2016, w/progressive dysphagia w/thin liquid intake since, though did not seek intervention until early May. Pt reports recent ENT laryngoscopy ~1 week PTA, which was WFL, however MD rx thickened liquids prophylactically, w/completion of MBSV to r/o aspiration. Pt scheduled for outpatient MBSV @ [facility] on 6/28/21. Recent esophagram completed 5/3/21, results, "Apparent filling defect in the hypopharynx, differential diagnosis includes fibromuscular polyp. But no as[iration occurred during the esophagram. Recommend follow-up as clinically endoscopy or CT of neck with oral and IV contrast can be ordered as clinically indicated. Slightly delayed oral phase.  Follow-up modified barium swallowing study can be ordered as clinically indicated".    Yesterday MBS c/w moderate to severe oropharyngeal dysphagia and that he should be NPO at least temporarily with alternate means of nutrition and should have further evaluation of defect in hypopharynx. At this time family does note that he has trouble with liquids and frequently coughs while eating. However at this time neither he or family wants to proceed with PEG. MRI of brain pending. He has no GI complaints    REVIEW OF SYSTEMS:    CONSTITUTIONAL: No fever, weight loss, or fatigue  EYES: No eye pain, visual disturbances, or discharge  ENMT:  No difficulty hearing, tinnitus, vertigo; No sinus or throat pain  NECK: No pain or stiffness  RESPIRATORY: No cough, wheezing, chills or hemoptysis; No shortness of breath  CARDIOVASCULAR: No chest pain, palpitations, dizziness, or leg swelling  GASTROINTESTINAL: No abdominal or epigastric pain. No nausea, vomiting, or hematemesis; No diarrhea or constipation. No melena or hematochezia.  NEUROLOGICAL: as above  SKIN: No itching, burning, rashes, or lesions   LYMPH NODES: No enlarged glands  MUSCULOSKELETAL: No joint pain or swelling; No muscle, back, or extremity pain  PSYCHIATRIC: No depression, anxiety, mood swings, or difficulty sleeping  HEME/LYMPH: No easy bruising, or bleeding gums  ALLERY AND IMMUNOLOGIC: No hives or eczema      PAST MEDICAL & SURGICAL HISTORY:  Parkinson disease    Glaucoma    HTN (hypertension)    History of BPH    No significant past surgical history        FAMILY HISTORY:  No pertinent family history in first degree relatives        SOCIAL HISTORY:  Smoking Status: [ ] Current, [x ] Former, [ ] Never-quit many years ago  Pack Years:  Alcohol Use: none    Home Medications:  acetaZOLAMIDE 250 mg oral tablet: 1 tab(s) orally once a day (01 Jun 2021 09:07)  aspirin 81 mg oral tablet: 1 tab(s) orally once a day (01 Jun 2021 09:07)  brinzolamide-brimonidine 1%-0.2% ophthalmic suspension: 1 drop(s) to each affected eye 2 times a day (01 Jun 2021 09:07)  Flomax 0.4 mg oral capsule: 1 cap(s) orally once a day (01 Jun 2021 09:07)  pilocarpine 1% ophthalmic solution: 1 drop(s) to each affected eye 3 times a day (01 Jun 2021 09:07)  Rocklatan 0.02%-0.005% ophthalmic solution: 1 drop(s) in each eye once a day (in the evening) (01 Jun 2021 09:07)  selegiline 5 mg oral tablet: 1 tab(s) orally 2 times a day (01 Jun 2021 09:07)  Sinemet 25 mg-100 mg oral tablet: 2 tab(s) orally once a day (01 Jun 2021 09:07)  Sinemet 25 mg-100 mg oral tablet: 1.5 tab(s) orally once a day at 4pm (01 Jun 2021 09:07)  Sinemet 25 mg-100 mg oral tablet: 1.5 tab(s) orally once a day (at bedtime) (01 Jun 2021 09:07)  timolol hemihydrate 0.5% ophthalmic solution: 1 drop(s) to each affected eye 2 times a day (01 Jun 2021 09:07)  Toprol- mg oral tablet, extended release: 1 tab(s) orally once a day (01 Jun 2021 09:07)      MEDICATIONS:  MEDICATIONS  (STANDING):  acetaZOLAMIDE    Tablet 250 milliGRAM(s) Oral at bedtime  aspirin  chewable 81 milliGRAM(s) Oral daily  atorvastatin 80 milliGRAM(s) Oral at bedtime  brimonidine 0.2% Ophthalmic Solution 1 Drop(s) Both EYES three times a day  carbidopa/levodopa  25/100 2 Tablet(s) Oral daily  carbidopa/levodopa  25/100 1.5 Tablet(s) Oral <User Schedule>  carbidopa/levodopa  25/100 1.5 Tablet(s) Oral at bedtime  dextrose 5% + lactated ringers. 1000 milliLiter(s) (60 mL/Hr) IV Continuous <Continuous>  heparin   Injectable 5000 Unit(s) SubCutaneous every 12 hours  latanoprost 0.005% Ophthalmic Solution 1 Drop(s) Both EYES at bedtime  metoprolol succinate  milliGRAM(s) Oral daily  pilocarpine 1% Solution 1 Drop(s) Right EYE three times a day  selegiline Oral Tab/Cap 5 milliGRAM(s) Oral two times a day  tamsulosin 0.4 milliGRAM(s) Oral at bedtime  timolol 0.5% Solution 1 Drop(s) Both EYES two times a day    MEDICATIONS  (PRN):      Allergies    No Known Allergies    Intolerances        Vital Signs Last 24 Hrs  T(C): 36.8 (03 Jun 2021 07:49), Max: 36.8 (03 Jun 2021 07:49)  T(F): 98.2 (03 Jun 2021 07:49), Max: 98.2 (03 Jun 2021 07:49)  HR: 67 (03 Jun 2021 07:49) (58 - 78)  BP: 124/70 (03 Jun 2021 07:49) (124/68 - 159/75)  BP(mean): --  RR: 19 (03 Jun 2021 07:49) (16 - 19)  SpO2: 99% (03 Jun 2021 07:49) (98% - 99%)        PHYSICAL EXAM:    General: thin male, sitting up in [place], awake and alert but difficult to understand due to some accent and some dysarthria but in no acute distress  HEENT: MMM, conjunctiva and sclera clear  Lungs: Clear  Heart: Rhythm Regular, No Murmurs  Gastrointestinal: Soft, non-tender non-distended; Normal bowel sounds; No rebound or guarding; No Organomegaly & No Masses  Extremities: Normal range of motion, No clubbing, cyanosis or edema  Neurological: Alert and oriented x3, Non-focal  Skin: Warm and dry. No obvious rash        LABS:    06-03    144  |  110<H>  |  14.4  ----------------------------<  76  3.9   |  21.0<L>  |  1.22    Ca    9.4      03 Jun 2021 07:33            RADIOLOGY & ADDITIONAL STUDIES:    < from: CT Angio Head w/ IV Cont (05.31.21 @ 22:05) >   EXAM:  CT ANGIO BRAIN (W)AW IC                         EXAM:  CT ANGIO NECK (W)AW IC                         EXAM:  CT PERFUSION W MAPS IC                          PROCEDURE DATE:  05/31/2021          INTERPRETATION:  HISTORY:  Acute onset right-sided weakness, stroke alert, history of Parkinson's disease    COMPARISON: None    TECHNIQUE: CT angiogram of the neck and brain was performed after administration of intravenous contrast. MIP and 3D reconstructions were performed.    Perfusion CT through the brain obtained during separate bolus injection of intravenous contrast.  Processing of the CT perfusion data, specifically, maps of mean transit time, cerebral blood flow and cerebral blood volume were generated using [employer] RAPID.    Total of 120 cc Omnipaque 350 intravenous contrast were administered without complication. 0 cc intravenous contrast discarded.    FINDINGS:    CT PERFUSION:    CBF <30%: 0 ml  Tmax > 6s: 0 ml  Mismatch volume=  0 ml    At the imaged levels, normal meantransit time, cerebral blood flow, and cerebral blood volume are demonstrated bilaterally. There is no evidence of irreversible, potentially reversible, or compensated perfusion abnormality within these portions of the brain.      CTA BRAIN    INTERNAL CAROTID ARTERIES:  The intracranial segments of the ICA are patent without hemodynamically significant stenosis, occlusion, or aneurysm. The ICA bifurcations are unremarkable.    ANTERIOR CEREBRAL ARTERIES: No flow-limiting stenosis or occlusion. Anterior communicating artery is unremarkable without aneurysm.    MIDDLE CEREBRAL ARTERIES: No flow-limiting stenosis or occlusion. MCA bifurcations are unremarkable without aneurysm.    POSTERIOR CEREBRAL ARTERIES: No flow-limiting stenosis or occlusion. Posterior communicating arteries are not well seen bilaterally.    VERTEBROBASILAR SYSTEM: No flow-limiting stenosis or occlusion. Basilar tip is unremarkable.    CTA NECK    RIGHT CAROTID SYSTEM: Normal in course and caliber without flow-limiting stenosis or occlusion.    LEFT CAROTID SYSTEM: Normal in course and caliber without flow-limiting stenosis or occlusion.    VERTEBRAL SYSTEM:  Normal in course and caliber  without flow-limiting stenosis or occlusion. Origin of the vertebral arteries are unremarkable.    AORTIC ARCH: Origin of the great vessels are unremarkable.    IMPRESSION:    CT PERFUSION: Normal perfusion..    CTA BRAIN: Patent intracranial circulation. No flow-limiting stenosis or occlusion.    CTA NECK: Patent cervical vasculature. No flow limiting stenosis or occlusion.            KARISSA HAMMONDS MD; Attending Radiologist  This document has been electronically signed. May 31 2021 10:23PM    < end of copied text >  < from: CT Brain Stroke Protocol (05.31.21 @ 21:57) >   EXAM:  CT BRAIN STROKE PROTOCOL                          PROCEDURE DATE:  05/31/2021          INTERPRETATION:  EXAMINATION: CT HEAD STROKE PROTOCOL    DATE: 5/31/2021 9:57 PM    INDICATION: Acute onset right-sided weakness, stroke code, history ofParkinson's disease    COMPARISON: None available.    TECHNIQUE: Thin section noncontrast axial images were obtained through the head. RAPID AI was utilized for preliminary assessment of intracranial hemorrhage.  .    FINDINGS:  Intracranial Contents:    The density of the brain is appropriate for age. Gray-white differentiation is preserved. The ventricles and sulci are normal in size and configuration. The basilar cisterns are clear. No focal edema or evidence of acute mass effect. There is no intracranial fluid collection or acute hemorrhage.    Bones and Extracranial Soft Tissues:    There is no fracture identified. The visualized paranasal sinuses have minimal mucosal thickening and the mastoid air cells are clear. Scalp and imaged facial soft tissues are within normal limits.      IMPRESSION:  1. Unremarkable, unenhanced CT head.    Critical results relayed to Dr. Payne at 10:06 P    < end of copied text >

## 2021-06-03 NOTE — CONSULT NOTE ADULT - SUBJECTIVE AND OBJECTIVE BOX
HPI:  74 y/o male with hx of glaucoma, HTN, Parkinsons who resides at home with his wife. He uses a cane at baseline, and admits to being on dysphagia diet. Patient noted right hand was " not working well around 4pm yesterday." Then around 7pm his wife noted he wasnt putting the fork in the right spot while eating dinner, the food was hitting his cheek. Denies any HA, CP, SOB, palpations, hx of afib. Denies any falls. In the ED patient with CT/A/P without acute findings or LVO. NIH 3. Evaluated by Tele-stroke and not a TPA candidate based on LKW and low NIH. Of note patient with normal glucose on BMP, no hx of DM, and no neuro-glycopenic symptoms, however repeat accu checks with erroneous low readings.  (01 Jun 2021 03:09)    He has hx of  Parkinson's dx in 2016, w/progressive dysphagia w/thin liquid intake since, though did not seek intervention until early May. Pt reports recent ENT laryngoscopy ~1 week PTA, which was WFL, however MD rx thickened liquids prophylactically, w/completion of MBSV to r/o aspiration. Pt scheduled for outpatient MBSV @ [facility] on 5/28/21. Recent esophagram completed 5/3/21, results, "Apparent filling defect in the hypopharynx, differential diagnosis includes fibromuscular polyp. But no as[iration occurred during the esophagram. Recommend follow-up as clinically endoscopy or CT of neck with oral and IV contrast can be ordered as clinically indicated. Slightly delayed oral phase.  Follow-up modified barium swallowing study can be ordered as clinically indicated".    Yesterday MBS c/w moderate to severe oropharyngeal dysphagia and that he should be NPO at least temporarily with alternate means of nutrition and should have further evaluation of defect in hypopharynx. At this time family does note that he has trouble with liquids and frequently coughs while eating. However at this time neither he or family wants to proceed with PEG. MRI of brain pending. He has no GI complaints    ENT consulted for evaluation of hypophaynx.  MBSS reviewed    REVIEW OF SYSTEMS:    CONSTITUTIONAL: No fever, weight loss, or fatigue  EYES: No eye pain, visual disturbances, or discharge  ENMT:  No difficulty hearing, tinnitus, vertigo; No sinus or throat pain  NECK: No pain or stiffness  RESPIRATORY: No cough, wheezing, chills or hemoptysis; No shortness of breath  CARDIOVASCULAR: No chest pain, palpitations, dizziness, or leg swelling  GASTROINTESTINAL: No abdominal or epigastric pain. No nausea, vomiting, or hematemesis; No diarrhea or constipation. No melena or hematochezia.  NEUROLOGICAL: as above  SKIN: No itching, burning, rashes, or lesions   LYMPH NODES: No enlarged glands  MUSCULOSKELETAL: No joint pain or swelling; No muscle, back, or extremity pain  PSYCHIATRIC: No depression, anxiety, mood swings, or difficulty sleeping  HEME/LYMPH: No easy bruising, or bleeding gums  ALLERY AND IMMUNOLOGIC: No hives or eczema      PAST MEDICAL & SURGICAL HISTORY:  Parkinson disease    Glaucoma    HTN (hypertension)    History of BPH    No significant past surgical history        FAMILY HISTORY:  No pertinent family history in first degree relatives        SOCIAL HISTORY:  Smoking Status: [ ] Current, [x ] Former, [ ] Never-quit many years ago  Pack Years:  Alcohol Use: none    Home Medications:  acetaZOLAMIDE 250 mg oral tablet: 1 tab(s) orally once a day (01 Jun 2021 09:07)  aspirin 81 mg oral tablet: 1 tab(s) orally once a day (01 Jun 2021 09:07)  brinzolamide-brimonidine 1%-0.2% ophthalmic suspension: 1 drop(s) to each affected eye 2 times a day (01 Jun 2021 09:07)  Flomax 0.4 mg oral capsule: 1 cap(s) orally once a day (01 Jun 2021 09:07)  pilocarpine 1% ophthalmic solution: 1 drop(s) to each affected eye 3 times a day (01 Jun 2021 09:07)  Rocklatan 0.02%-0.005% ophthalmic solution: 1 drop(s) in each eye once a day (in the evening) (01 Jun 2021 09:07)  selegiline 5 mg oral tablet: 1 tab(s) orally 2 times a day (01 Jun 2021 09:07)  Sinemet 25 mg-100 mg oral tablet: 2 tab(s) orally once a day (01 Jun 2021 09:07)  Sinemet 25 mg-100 mg oral tablet: 1.5 tab(s) orally once a day at 4pm (01 Jun 2021 09:07)  Sinemet 25 mg-100 mg oral tablet: 1.5 tab(s) orally once a day (at bedtime) (01 Jun 2021 09:07)  timolol hemihydrate 0.5% ophthalmic solution: 1 drop(s) to each affected eye 2 times a day (01 Jun 2021 09:07)  Toprol- mg oral tablet, extended release: 1 tab(s) orally once a day (01 Jun 2021 09:07)      MEDICATIONS:  MEDICATIONS  (STANDING):  acetaZOLAMIDE    Tablet 250 milliGRAM(s) Oral at bedtime  aspirin  chewable 81 milliGRAM(s) Oral daily  atorvastatin 80 milliGRAM(s) Oral at bedtime  brimonidine 0.2% Ophthalmic Solution 1 Drop(s) Both EYES three times a day  carbidopa/levodopa  25/100 2 Tablet(s) Oral daily  carbidopa/levodopa  25/100 1.5 Tablet(s) Oral <User Schedule>  carbidopa/levodopa  25/100 1.5 Tablet(s) Oral at bedtime  dextrose 5% + lactated ringers. 1000 milliLiter(s) (60 mL/Hr) IV Continuous <Continuous>  heparin   Injectable 5000 Unit(s) SubCutaneous every 12 hours  latanoprost 0.005% Ophthalmic Solution 1 Drop(s) Both EYES at bedtime  metoprolol succinate  milliGRAM(s) Oral daily  pilocarpine 1% Solution 1 Drop(s) Right EYE three times a day  selegiline Oral Tab/Cap 5 milliGRAM(s) Oral two times a day  tamsulosin 0.4 milliGRAM(s) Oral at bedtime  timolol 0.5% Solution 1 Drop(s) Both EYES two times a day    MEDICATIONS  (PRN):      Allergies    No Known Allergies    Intolerances        Vital Signs Last 24 Hrs  T(C): 36.8 (03 Jun 2021 07:49), Max: 36.8 (03 Jun 2021 07:49)  T(F): 98.2 (03 Jun 2021 07:49), Max: 98.2 (03 Jun 2021 07:49)  HR: 67 (03 Jun 2021 07:49) (58 - 78)  BP: 124/70 (03 Jun 2021 07:49) (124/68 - 159/75)  BP(mean): --  RR: 19 (03 Jun 2021 07:49) (16 - 19)  SpO2: 99% (03 Jun 2021 07:49) (98% - 99%)        PHYSICAL EXAM:    General: thin male, sitting up in [place], awake and alert but difficult to understand due to some accent and some dysarthria but in no acute distress  HEENT: MMM, conjunctiva and sclera clear  Lungs: Clear  Heart: Rhythm Regular, No Murmurs  Gastrointestinal: Soft, non-tender non-distended; Normal bowel sounds; No rebound or guarding; No Organomegaly & No Masses  Extremities: Normal range of motion, No clubbing, cyanosis or edema  Neurological: Alert and oriented x3, Non-focal  Skin: Warm and dry. No obvious rash        LABS:    06-03    144  |  110<H>  |  14.4  ----------------------------<  76  3.9   |  21.0<L>  |  1.22    Ca    9.4      03 Jun 2021 07:33            RADIOLOGY & ADDITIONAL STUDIES:    < from: CT Angio Head w/ IV Cont (05.31.21 @ 22:05) >   EXAM:  CT ANGIO BRAIN (W)AW IC                         EXAM:  CT ANGIO NECK (W)AW IC                         EXAM:  CT PERFUSION W MAPS IC                          PROCEDURE DATE:  05/31/2021          INTERPRETATION:  HISTORY:  Acute onset right-sided weakness, stroke alert, history of Parkinson's disease    COMPARISON: None    TECHNIQUE: CT angiogram of the neck and brain was performed after administration of intravenous contrast. MIP and 3D reconstructions were performed.    Perfusion CT through the brain obtained during separate bolus injection of intravenous contrast.  Processing of the CT perfusion data, specifically, maps of mean transit time, cerebral blood flow and cerebral blood volume were generated using [employer] RAPID.    Total of 120 cc Omnipaque 350 intravenous contrast were administered without complication. 0 cc intravenous contrast discarded.    FINDINGS:    CT PERFUSION:    CBF <30%: 0 ml  Tmax > 6s: 0 ml  Mismatch volume=  0 ml    At the imaged levels, normal meantransit time, cerebral blood flow, and cerebral blood volume are demonstrated bilaterally. There is no evidence of irreversible, potentially reversible, or compensated perfusion abnormality within these portions of the brain.      CTA BRAIN    INTERNAL CAROTID ARTERIES:  The intracranial segments of the ICA are patent without hemodynamically significant stenosis, occlusion, or aneurysm. The ICA bifurcations are unremarkable.    ANTERIOR CEREBRAL ARTERIES: No flow-limiting stenosis or occlusion. Anterior communicating artery is unremarkable without aneurysm.    MIDDLE CEREBRAL ARTERIES: No flow-limiting stenosis or occlusion. MCA bifurcations are unremarkable without aneurysm.    POSTERIOR CEREBRAL ARTERIES: No flow-limiting stenosis or occlusion. Posterior communicating arteries are not well seen bilaterally.    VERTEBROBASILAR SYSTEM: No flow-limiting stenosis or occlusion. Basilar tip is unremarkable.    CTA NECK    RIGHT CAROTID SYSTEM: Normal in course and caliber without flow-limiting stenosis or occlusion.    LEFT CAROTID SYSTEM: Normal in course and caliber without flow-limiting stenosis or occlusion.    VERTEBRAL SYSTEM:  Normal in course and caliber  without flow-limiting stenosis or occlusion. Origin of the vertebral arteries are unremarkable.    AORTIC ARCH: Origin of the great vessels are unremarkable.    IMPRESSION:    CT PERFUSION: Normal perfusion..    CTA BRAIN: Patent intracranial circulation. No flow-limiting stenosis or occlusion.    CTA NECK: Patent cervical vasculature. No flow limiting stenosis or occlusion.            KARISSA HAMMONDS MD; Attending Radiologist  This document has been electronically signed. May 31 2021 10:23PM    < end of copied text >  < from: CT Brain Stroke Protocol (05.31.21 @ 21:57) >   EXAM:  CT BRAIN STROKE PROTOCOL                          PROCEDURE DATE:  05/31/2021          INTERPRETATION:  EXAMINATION: CT HEAD STROKE PROTOCOL    DATE: 5/31/2021 9:57 PM    INDICATION: Acute onset right-sided weakness, stroke code, history ofParkinson's disease    COMPARISON: None available.    TECHNIQUE: Thin section noncontrast axial images were obtained through the head. RAPID AI was utilized for preliminary assessment of intracranial hemorrhage.  .    FINDINGS:  Intracranial Contents:    The density of the brain is appropriate for age. Gray-white differentiation is preserved. The ventricles and sulci are normal in size and configuration. The basilar cisterns are clear. No focal edema or evidence of acute mass effect. There is no intracranial fluid collection or acute hemorrhage.    Bones and Extracranial Soft Tissues:    There is no fracture identified. The visualized paranasal sinuses have minimal mucosal thickening and the mastoid air cells are clear. Scalp and imaged facial soft tissues are within normal limits.      IMPRESSION:  1. Unremarkable, unenhanced CT head.

## 2021-06-03 NOTE — PROGRESS NOTE ADULT - ATTENDING COMMENTS
I have personally seen, examined and participated in the care of this patient. I have reviewed all pertinent clinical information, including history, physical exam, plan and agree with the above.   d/w plan pt and family at bedside. pt does not want NGT for short term nutrition and would not want PEG. to f/u speech, swallow; MRI pending

## 2021-06-03 NOTE — PROGRESS NOTE ADULT - SUBJECTIVE AND OBJECTIVE BOX
ANASTACIO RAMEY    [id number]    75y      Male    CC:     Pt seen and examined at bedside, patient denied any new symptoms   No new complaints  VSS on RA    REVIEW OF SYSTEMS:  CONSTITUTIONAL: No fever, weight loss  RESPIRATORY: No cough, wheezing, hemoptysis; No shortness of breath  CARDIOVASCULAR: No chest pain, palpitations  GASTROINTESTINAL: +difficulty of swallowing, No abdominal or epigastric pain. No nausea, vomiting  NEUROLOGICAL: No headaches    Vital Signs Last 24 Hrs  T(C): 36.8 (03 Jun 2021 07:49), Max: 36.8 (03 Jun 2021 07:49)  T(F): 98.2 (03 Jun 2021 07:49), Max: 98.2 (03 Jun 2021 07:49)  HR: 67 (03 Jun 2021 07:49) (58 - 78)  BP: 124/70 (03 Jun 2021 07:49) (124/68 - 160/85)  BP(mean): --  RR: 19 (03 Jun 2021 07:49) (16 - 19)  SpO2: 99% (03 Jun 2021 07:49) (98% - 99%) on RA    PHYSICAL EXAM:  GENERAL: NAD  HEENT: PERRL, +EOMI  NECK: soft, supple  CHEST/LUNG: Clear to auscultation bilaterally; No wheezing  HEART: S1S2+, Regular rate and rhythm  ABDOMEN: Soft, Nontender, Nondistended; Bowel sounds present  SKIN: warm, dry  NEURO: AAOX3, sensory intact, muscle strength 5/5 in b/l UE and LE, follows all commands   PSYCH: normal affect    LABS: REVIEWED              MEDICATIONS  (STANDING):  acetaZOLAMIDE    Tablet 250 milliGRAM(s) Oral at bedtime  aspirin  chewable 81 milliGRAM(s) Oral daily  atorvastatin 80 milliGRAM(s) Oral at bedtime  brimonidine 0.2% Ophthalmic Solution 1 Drop(s) Both EYES three times a day  carbidopa/levodopa  25/100 2 Tablet(s) Oral daily  carbidopa/levodopa  25/100 1.5 Tablet(s) Oral <User Schedule>  carbidopa/levodopa  25/100 1.5 Tablet(s) Oral at bedtime  heparin   Injectable 5000 Unit(s) SubCutaneous every 12 hours  latanoprost 0.005% Ophthalmic Solution 1 Drop(s) Both EYES at bedtime  metoprolol succinate  milliGRAM(s) Oral daily  pilocarpine 1% Solution 1 Drop(s) Right EYE three times a day  selegiline Oral Tab/Cap 5 milliGRAM(s) Oral two times a day  tamsulosin 0.4 milliGRAM(s) Oral at bedtime  timolol 0.5% Solution 1 Drop(s) Both EYES two times a day      RADIOLOGY & ADDITIONAL TESTS: REVIEWED

## 2021-06-04 DIAGNOSIS — R53.81 OTHER MALAISE: ICD-10-CM

## 2021-06-04 DIAGNOSIS — Z51.5 ENCOUNTER FOR PALLIATIVE CARE: ICD-10-CM

## 2021-06-04 DIAGNOSIS — K59.00 CONSTIPATION, UNSPECIFIED: ICD-10-CM

## 2021-06-04 PROBLEM — Z87.438 PERSONAL HISTORY OF OTHER DISEASES OF MALE GENITAL ORGANS: Chronic | Status: ACTIVE | Noted: 2021-06-01

## 2021-06-04 PROBLEM — G20 PARKINSON'S DISEASE: Chronic | Status: ACTIVE | Noted: 2021-06-01

## 2021-06-04 PROBLEM — H40.9 UNSPECIFIED GLAUCOMA: Chronic | Status: ACTIVE | Noted: 2021-06-01

## 2021-06-04 PROBLEM — I10 ESSENTIAL (PRIMARY) HYPERTENSION: Chronic | Status: ACTIVE | Noted: 2021-06-01

## 2021-06-04 LAB
ANION GAP SERPL CALC-SCNC: 9 MMOL/L — SIGNIFICANT CHANGE UP (ref 5–17)
BUN SERPL-MCNC: 17.2 MG/DL — SIGNIFICANT CHANGE UP (ref 8–20)
CALCIUM SERPL-MCNC: 9.5 MG/DL — SIGNIFICANT CHANGE UP (ref 8.6–10.2)
CHLORIDE SERPL-SCNC: 113 MMOL/L — HIGH (ref 98–107)
CO2 SERPL-SCNC: 22 MMOL/L — SIGNIFICANT CHANGE UP (ref 22–29)
CREAT SERPL-MCNC: 1.2 MG/DL — SIGNIFICANT CHANGE UP (ref 0.5–1.3)
GLUCOSE BLDC GLUCOMTR-MCNC: 75 MG/DL — SIGNIFICANT CHANGE UP (ref 70–99)
GLUCOSE BLDC GLUCOMTR-MCNC: 84 MG/DL — SIGNIFICANT CHANGE UP (ref 70–99)
GLUCOSE BLDC GLUCOMTR-MCNC: 93 MG/DL — SIGNIFICANT CHANGE UP (ref 70–99)
GLUCOSE BLDC GLUCOMTR-MCNC: 98 MG/DL — SIGNIFICANT CHANGE UP (ref 70–99)
GLUCOSE SERPL-MCNC: 91 MG/DL — SIGNIFICANT CHANGE UP (ref 70–99)
HCT VFR BLD CALC: 36.9 % — LOW (ref 39–50)
HGB BLD-MCNC: 11.9 G/DL — LOW (ref 13–17)
MAGNESIUM SERPL-MCNC: 2.1 MG/DL — SIGNIFICANT CHANGE UP (ref 1.6–2.6)
MCHC RBC-ENTMCNC: 29.5 PG — SIGNIFICANT CHANGE UP (ref 27–34)
MCHC RBC-ENTMCNC: 32.2 GM/DL — SIGNIFICANT CHANGE UP (ref 32–36)
MCV RBC AUTO: 91.6 FL — SIGNIFICANT CHANGE UP (ref 80–100)
PLATELET # BLD AUTO: 201 K/UL — SIGNIFICANT CHANGE UP (ref 150–400)
POTASSIUM SERPL-MCNC: 3.9 MMOL/L — SIGNIFICANT CHANGE UP (ref 3.5–5.3)
POTASSIUM SERPL-SCNC: 3.9 MMOL/L — SIGNIFICANT CHANGE UP (ref 3.5–5.3)
RBC # BLD: 4.03 M/UL — LOW (ref 4.2–5.8)
RBC # FLD: 13.4 % — SIGNIFICANT CHANGE UP (ref 10.3–14.5)
SODIUM SERPL-SCNC: 144 MMOL/L — SIGNIFICANT CHANGE UP (ref 135–145)
WBC # BLD: 5.42 K/UL — SIGNIFICANT CHANGE UP (ref 3.8–10.5)
WBC # FLD AUTO: 5.42 K/UL — SIGNIFICANT CHANGE UP (ref 3.8–10.5)

## 2021-06-04 PROCEDURE — 99497 ADVNCD CARE PLAN 30 MIN: CPT | Mod: 25

## 2021-06-04 PROCEDURE — 99222 1ST HOSP IP/OBS MODERATE 55: CPT

## 2021-06-04 PROCEDURE — 99232 SBSQ HOSP IP/OBS MODERATE 35: CPT

## 2021-06-04 PROCEDURE — 70551 MRI BRAIN STEM W/O DYE: CPT | Mod: 26

## 2021-06-04 RX ADMIN — LATANOPROST 1 DROP(S): 0.05 SOLUTION/ DROPS OPHTHALMIC; TOPICAL at 21:59

## 2021-06-04 RX ADMIN — ACETAZOLAMIDE 250 MILLIGRAM(S): 250 TABLET ORAL at 22:00

## 2021-06-04 RX ADMIN — HEPARIN SODIUM 5000 UNIT(S): 5000 INJECTION INTRAVENOUS; SUBCUTANEOUS at 05:57

## 2021-06-04 RX ADMIN — ATORVASTATIN CALCIUM 80 MILLIGRAM(S): 80 TABLET, FILM COATED ORAL at 21:58

## 2021-06-04 RX ADMIN — HEPARIN SODIUM 5000 UNIT(S): 5000 INJECTION INTRAVENOUS; SUBCUTANEOUS at 17:19

## 2021-06-04 RX ADMIN — Medication 100 MILLIGRAM(S): at 11:15

## 2021-06-04 RX ADMIN — BRIMONIDINE TARTRATE 1 DROP(S): 2 SOLUTION/ DROPS OPHTHALMIC at 11:15

## 2021-06-04 RX ADMIN — Medication 1 DROP(S): at 11:15

## 2021-06-04 RX ADMIN — SELEGILINE HYDROCHLORIDE 5 MILLIGRAM(S): 1.25 TABLET, ORALLY DISINTEGRATING ORAL at 05:57

## 2021-06-04 RX ADMIN — CARBIDOPA AND LEVODOPA 1.5 TABLET(S): 25; 100 TABLET ORAL at 21:59

## 2021-06-04 RX ADMIN — Medication 1 DROP(S): at 05:57

## 2021-06-04 RX ADMIN — Medication 1 DROP(S): at 17:19

## 2021-06-04 RX ADMIN — TAMSULOSIN HYDROCHLORIDE 0.4 MILLIGRAM(S): 0.4 CAPSULE ORAL at 22:00

## 2021-06-04 RX ADMIN — BRIMONIDINE TARTRATE 1 DROP(S): 2 SOLUTION/ DROPS OPHTHALMIC at 05:56

## 2021-06-04 RX ADMIN — Medication 81 MILLIGRAM(S): at 11:15

## 2021-06-04 RX ADMIN — CARBIDOPA AND LEVODOPA 2 TABLET(S): 25; 100 TABLET ORAL at 11:15

## 2021-06-04 RX ADMIN — BRIMONIDINE TARTRATE 1 DROP(S): 2 SOLUTION/ DROPS OPHTHALMIC at 22:00

## 2021-06-04 RX ADMIN — Medication 1 DROP(S): at 21:59

## 2021-06-04 RX ADMIN — SELEGILINE HYDROCHLORIDE 5 MILLIGRAM(S): 1.25 TABLET, ORALLY DISINTEGRATING ORAL at 17:19

## 2021-06-04 NOTE — CONSULT NOTE ADULT - PROBLEM SELECTOR RECOMMENDATION 2
-on carbidopa/levodopa  -PD likely contributor to dysphagia -on carbidopa/levodopa, selegiline  -PD likely contributor to dysphagia

## 2021-06-04 NOTE — CONSULT NOTE ADULT - PROBLEM SELECTOR RECOMMENDATION 6
-Introduced role of palliative care in symptom management, care planning, support, and transitions in care to those with serious illness.  Emotional support offered.  -Reviewed role of HCP: patient assigns wifeBerta as primary, daughterSarai as alternate.  HCP form completed.  -Full code orders.  States that he is Alevism, believes God is active in his life and will take him when he is ready.  Inquired if heart stops, if that is God calling him.  States that his bashir is not inconsistent with CPR and states that he is considering CPR and intubation.  Wishes to maintain full code orders.  -Wishes to maintain a PO diet.  Would collaborate with SLP to determine safest diet, with aspiration precautions taken, if patient maintains desire for PO diet, reiterating known risk of aspiration and sequelae.  -Patient's current goals: to get stronger, better, inconsistent with a hospice/comfort focused plan.  Reviewed progressive nature of Parkinson's disease.  Introduced hospice as a framework of support when patient wishes to focus more on comfort.  -Patient amenable to writer reaching out to wife, daughter, Sarai.  Unable to reach.  Messages left  -Palliative care will follow for support.    Total time spent in conversation re: advanced care plannin min.    Pt reviewed with Dr. Shannon, RN -Introduced role of palliative care in symptom management, care planning, support, and transitions in care to those with serious illness.  Emotional support offered.  -Reviewed role of HCP: patient assigns wifeBerta as primary, daughterSarai as alternate.  HCP form completed.  -Full code orders.  States that he is Yazdanism, believes God is active in his life and will take him when he is ready.  Inquired if heart stops, if that is God calling him.  States that his bashir is not inconsistent with CPR and states that he is thinking about CPR and intubation.  Wishes to maintain full code orders.  -Wishes to maintain a PO diet.  Would collaborate with SLP to determine safest diet, with aspiration precautions taken, if patient maintains desire for PO diet, reiterating known risk of aspiration and sequelae.  -Patient's current goals: to get stronger, better, inconsistent with a hospice/comfort focused plan.  Reviewed progressive nature of Parkinson's disease.  Introduced hospice as a framework of support when patient wishes to focus more on comfort.  -Patient amenable to writer reaching out to wife, daughter, Sarai.  Unable to reach.  Messages left  -Palliative care will follow for support.    Total time spent in conversation re: advanced care plannin min.    Pt reviewed with Dr. Shannon, RN

## 2021-06-04 NOTE — PROGRESS NOTE ADULT - ASSESSMENT
Reasonale PEG candidate.  Pt and family decline PEG insertion.    Not candidate for TPN.    Reconsult GI if the situation changes.

## 2021-06-04 NOTE — CONSULT NOTE ADULT - PROBLEM SELECTOR RECOMMENDATION 3
-Hx of progressive dysphagia since 2016, on a dysphagia diet: reg solids with thickened liquids, only recently evaluated.  -Esophagram in 5/2021:  "Apparent filling defect in the hypopharynx, differential diagnosis includes fibromuscular polyp. Recommend follow-up as clinically endoscopy or CT of neck with oral and IV contrast can be ordered as clinically indicated. Slightly delayed oral phase, patient has parkinsonism. Follow-up modified barium swallowing study can be ordered as clinically indicated".     -Dysphagia w/u at [facility]: MBS, no obvious obstructive lesion noted.  ENT:  Normal fiberoptic laryngoscopy.  No pharyngeal polyp or diverticula noted.    -GI notes that patient is appropriate for PEG, but declined it.   -Patient states that he is tolerating his current diet at home, and wishes to maintain it.  He hesitates from PEG placement, noting a permanence to it that he wishes to avoid.  -Reviewed risk of aspiration, aspiration PNA and complications associated with it.  He maintains a desire for a modified PO diet. -Hx of progressive dysphagia since 2016, on a dysphagia diet: reg solids with thickened liquids, only recently evaluated.  -Esophagram in 5/2021:  "Apparent filling defect in the hypopharynx, differential diagnosis includes fibromuscular polyp. Recommend follow-up as clinically endoscopy or CT of neck with oral and IV contrast can be ordered as clinically indicated. Slightly delayed oral phase, patient has parkinsonism. Follow-up modified barium swallowing study can be ordered as clinically indicated".     -Dysphagia w/u at [facility]: MBS, no obvious obstructive lesion noted.  ENT:  Normal fiberoptic laryngoscopy.  No pharyngeal polyp or diverticula noted.    -GI notes that patient is appropriate for PEG, but pt declined it.   -Patient states that he is tolerating his current diet at home, and wishes to maintain it.  He hesitates from PEG placement, noting a permanence to it that he wishes to avoid.  -Reviewed risk of aspiration, aspiration PNA and complications associated with it.  He maintains a desire for a modified PO diet.

## 2021-06-04 NOTE — CONSULT NOTE ADULT - SUBJECTIVE AND OBJECTIVE BOX
HPI:  76 y/o male with hx of glaucoma, HTN, Parkinsons who resides at home with his wife. He uses a cane at baseline, and admits to being on dysphagia diet. Patient noted right hand was " not working well around 4pm yesterday." Then around 7pm his wife noted he wasnt putting the fork in the right spot while eating dinner, the food was hitting his cheek. Denies any HA, CP, SOB, palpations, hx of afib. Denies any falls. In the ED patient with CT/A/P without acute findings or LVO. NIH 3. Evaluated by Tele-stroke and not a TPA candidate based on LKW and low NIH. Of note patient with normal glucose on BMP, no hx of DM, and no neuro-glycopenic symptoms, however repeat accu checks with erroneous low readings.  (01 Jun 2021 03:09)      HPI:    PERTINENT PMH REVIEWED: Yes No    PAST MEDICAL & SURGICAL HISTORY:  Parkinson disease    Glaucoma    HTN (hypertension)    History of BPH    No significant past surgical history        SOCIAL HISTORY:                                     Admitted from:  home  SNF  [facility]     Surrogate/HCP/Guardian: Phone#:    FAMILY HISTORY:  No pertinent family history in first degree relatives        Baseline ADLs (prior to admission):  Independent/ Dependent      Allergies    No Known Allergies    Intolerances        Present Symptoms:     Dyspnea: 0 1 2 3   Nausea/Vomiting: Yes No  Anxiety:  Yes No  Depression: Yes No  Fatigue: Yes No  Loss of appetite: Yes No    Pain:             Character-            Duration-            Effect-            Factors-            Frequency-            Location-            Severity-    Review of Systems: Reviewed                     Negative:                     Positive:  Unable to obtain due to poor mentation   All others negative    MEDICATIONS  (STANDING):  acetaZOLAMIDE    Tablet 250 milliGRAM(s) Oral at bedtime  aspirin  chewable 81 milliGRAM(s) Oral daily  atorvastatin 80 milliGRAM(s) Oral at bedtime  brimonidine 0.2% Ophthalmic Solution 1 Drop(s) Both EYES three times a day  carbidopa/levodopa  25/100 2 Tablet(s) Oral daily  carbidopa/levodopa  25/100 1.5 Tablet(s) Oral <User Schedule>  carbidopa/levodopa  25/100 1.5 Tablet(s) Oral at bedtime  dextrose 5% + lactated ringers. 1000 milliLiter(s) (60 mL/Hr) IV Continuous <Continuous>  heparin   Injectable 5000 Unit(s) SubCutaneous every 12 hours  latanoprost 0.005% Ophthalmic Solution 1 Drop(s) Both EYES at bedtime  metoprolol succinate  milliGRAM(s) Oral daily  pilocarpine 1% Solution 1 Drop(s) Right EYE three times a day  selegiline Oral Tab/Cap 5 milliGRAM(s) Oral two times a day  tamsulosin 0.4 milliGRAM(s) Oral at bedtime  timolol 0.5% Solution 1 Drop(s) Both EYES two times a day    MEDICATIONS  (PRN):      PHYSICAL EXAM:    Vital Signs Last 24 Hrs  T(C): 36.9 (04 Jun 2021 08:29), Max: 36.9 (04 Jun 2021 08:29)  T(F): 98.4 (04 Jun 2021 08:29), Max: 98.4 (04 Jun 2021 08:29)  HR: 66 (04 Jun 2021 08:29) (58 - 66)  BP: 101/63 (04 Jun 2021 08:29) (101/63 - 161/79)  BP(mean): --  RR: 18 (04 Jun 2021 08:29) (18 - 18)  SpO2: 99% (04 Jun 2021 08:29) (96% - 100%)      Karnofsky:  %    Gen: In NAD.  No pain behaviors or work of breathing noted  Neuro: alert and oriented x 3, awake, alert, communicates needs.  Lethargic, rouses briefly to verbal/tactile stim and returns to sleep  Head: NC/AT, bitemporal wasting  Eyes: sclerae non-icteric  ENT: moist oral mucosa, dry oral mucosa  Resp: clear, unlabored  CV: S1, S2  Abd: soft, non-tender, + bowels sounds  : hernandez bag/external urine collection system without gross hematuria  Peripheral Vasc: no pedal edema, anasarca, +b/l LE edema, cool LE digits  Int: warm and dry  Psych: no psychomotor agitation        LABS:                        11.9   5.42  )-----------( 201      ( 04 Jun 2021 06:15 )             36.9     06-04    144  |  113<H>  |  17.2  ----------------------------<  91  3.9   |  22.0  |  1.20    Ca    9.5      04 Jun 2021 06:15  Mg     2.1     06-04          I&O's Summary      RADIOLOGY & ADDITIONAL STUDIES:    ADVANCE DIRECTIVES:   DNR YES NO  Completed on:                     MOLST  YES NO   Completed on:  Living Will  YES NO   Completed on:     HPI:  74 y/o male with hx of glaucoma, HTN, Parkinsons who resides at home with his wife. He uses a cane at baseline, and admits to being on dysphagia diet. Patient noted right hand was " not working well around 4pm yesterday." Then around 7pm his wife noted he wasnt putting the fork in the right spot while eating dinner, the food was hitting his cheek. Denies any HA, CP, SOB, palpations, hx of afib. Denies any falls. In the ED patient with CT/A/P without acute findings or LVO. NIH 3. Evaluated by Tele-stroke and not a TPA candidate based on LKW and low NIH. Of note patient with normal glucose on BMP, no hx of DM, and no neuro-glycopenic symptoms, however repeat accu checks with erroneous low readings.  (01 Jun 2021 03:09)    HPI: 74 y/o male with hx of HTN, glaucoma, Parkinsons (resides at home with wife, uses cane at baseline, on dysphagia diet) presents to [facility] after noting that his right hand was " not working well around 4pm yesterday." Per admission notes, wife noted that he wasn't putting the fork in the right spot while eating dinner, and that food was hitting his cheek. Denies any HA, CP, SOB, palpations, hx of afib. Denies any falls. In the ED patient with CT/A/P without acute findings or LVO. NIH 3. Evaluated by Tele-stroke and not a TPA candidate based on LKW and low NIH. Of note patient with normal glucose on BMP, no hx of DM, and no neuro-glycopenic symptoms, however repeat accu checks with erroneous low readings.    CT of the head negative, CTA head/neck negative.  Admitted to r/o CVA.    Seen by neuro, who notes a possible CVA, rec an MRI of the brain to assess for cerebellar or internal capsule infarct, continue antiplatelet and statin, OT, PT.    On dysphagia diet on presentation-reg solids with thickened liquids, progressive dysphagia since 2016, only recently sought intervention.  Had an esophagram in 5/2021:  "Apparent filling defect in the hypopharynx, differential diagnosis includes fibromuscular polyp. Recommend follow-up as clinically endoscopy or CT of neck with oral and IV contrast can be ordered as clinically indicated. Slightly delayed oral phase, patient has parkinsonism. Follow-up modified barium swallowing study can be ordered as clinically indicated".       Dysphagia w/u: MBS, no obvious obstructive lesion noted.  ENT:  Normal fiberoptic laryngoscopy.  No pharyngeal polyp or diverticula noted.  Per ENT, dysphagia thought to be 2/2 progression of Parkinsons and recent CVA.  GI eval pending.    For goals of care.    PERTINENT PMH REVIEWED: Yes No    PAST MEDICAL & SURGICAL HISTORY:  Parkinson disease    Glaucoma    HTN (hypertension)    History of BPH    No significant past surgical history        SOCIAL HISTORY:                                     Admitted from:  home  SNF  [facility]     Surrogate/HCP/Guardian: Phone#:    FAMILY HISTORY:  No pertinent family history in first degree relatives        Baseline ADLs (prior to admission):  Independent/ Dependent      Allergies    No Known Allergies    Intolerances        Present Symptoms:     Dyspnea: 0 1 2 3   Nausea/Vomiting: Yes No  Anxiety:  Yes No  Depression: Yes No  Fatigue: Yes No  Loss of appetite: Yes No    Pain:             Character-            Duration-            Effect-            Factors-            Frequency-            Location-            Severity-    Review of Systems: Reviewed                     Negative:                     Positive:  Unable to obtain due to poor mentation   All others negative    MEDICATIONS  (STANDING):  acetaZOLAMIDE    Tablet 250 milliGRAM(s) Oral at bedtime  aspirin  chewable 81 milliGRAM(s) Oral daily  atorvastatin 80 milliGRAM(s) Oral at bedtime  brimonidine 0.2% Ophthalmic Solution 1 Drop(s) Both EYES three times a day  carbidopa/levodopa  25/100 2 Tablet(s) Oral daily  carbidopa/levodopa  25/100 1.5 Tablet(s) Oral <User Schedule>  carbidopa/levodopa  25/100 1.5 Tablet(s) Oral at bedtime  dextrose 5% + lactated ringers. 1000 milliLiter(s) (60 mL/Hr) IV Continuous <Continuous>  heparin   Injectable 5000 Unit(s) SubCutaneous every 12 hours  latanoprost 0.005% Ophthalmic Solution 1 Drop(s) Both EYES at bedtime  metoprolol succinate  milliGRAM(s) Oral daily  pilocarpine 1% Solution 1 Drop(s) Right EYE three times a day  selegiline Oral Tab/Cap 5 milliGRAM(s) Oral two times a day  tamsulosin 0.4 milliGRAM(s) Oral at bedtime  timolol 0.5% Solution 1 Drop(s) Both EYES two times a day    MEDICATIONS  (PRN):      PHYSICAL EXAM:    Vital Signs Last 24 Hrs  T(C): 36.9 (04 Jun 2021 08:29), Max: 36.9 (04 Jun 2021 08:29)  T(F): 98.4 (04 Jun 2021 08:29), Max: 98.4 (04 Jun 2021 08:29)  HR: 66 (04 Jun 2021 08:29) (58 - 66)  BP: 101/63 (04 Jun 2021 08:29) (101/63 - 161/79)  BP(mean): --  RR: 18 (04 Jun 2021 08:29) (18 - 18)  SpO2: 99% (04 Jun 2021 08:29) (96% - 100%)      Karnofsky:  %    Gen: In NAD.  No pain behaviors or work of breathing noted  Neuro: alert and oriented x 3, awake, alert, communicates needs.  Lethargic, rouses briefly to verbal/tactile stim and returns to sleep  Head: NC/AT, bitemporal wasting  Eyes: sclerae non-icteric  ENT: moist oral mucosa, dry oral mucosa  Resp: clear, unlabored  CV: S1, S2  Abd: soft, non-tender, + bowels sounds  : hernandez bag/external urine collection system without gross hematuria  Peripheral Vasc: no pedal edema, anasarca, +b/l LE edema, cool LE digits  Int: warm and dry  Psych: no psychomotor agitation        LABS:                        11.9   5.42  )-----------( 201      ( 04 Jun 2021 06:15 )             36.9     06-04    144  |  113<H>  |  17.2  ----------------------------<  91  3.9   |  22.0  |  1.20    Ca    9.5      04 Jun 2021 06:15  Mg     2.1     06-04          I&O's Summary      RADIOLOGY & ADDITIONAL STUDIES:    ADVANCE DIRECTIVES:   DNR YES NO  Completed on:                     MOLST  YES NO   Completed on:  Living Will  YES NO   Completed on:     HPI:  76 y/o male with hx of glaucoma, HTN, Parkinsons who resides at home with his wife. He uses a cane at baseline, and admits to being on dysphagia diet. Patient noted right hand was " not working well around 4pm yesterday." Then around 7pm his wife noted he wasnt putting the fork in the right spot while eating dinner, the food was hitting his cheek. Denies any HA, CP, SOB, palpations, hx of afib. Denies any falls. In the ED patient with CT/A/P without acute findings or LVO. NIH 3. Evaluated by Tele-stroke and not a TPA candidate based on LKW and low NIH. Of note patient with normal glucose on BMP, no hx of DM, and no neuro-glycopenic symptoms, however repeat accu checks with erroneous low readings.  (01 Jun 2021 03:09)    HPI: 76 y/o male with hx of HTN, glaucoma, Parkinsons (resides at home with wife, uses cane at baseline, on dysphagia diet) presents to [facility] after noting that his right hand was " not working well around 4pm yesterday." Per admission notes, wife noted that he wasn't putting the fork in the right spot while eating dinner, and that food was hitting his cheek. Denied any HA, CP, SOB, palpations, hx of afib, falls. In the ED patient's CT/A/P without acute findings or LVO. NIH 3. Evaluated by Tele-stroke and not a TPA candidate based on LKW and low NIH.  Of note, patient with normal glucose on BMP, no hx of DM, and no neuro-glycopenic symptoms, however repeat accu checks with erroneous low readings.    CT of the head negative, CTA head/neck negative.  Admitted to r/o CVA.      Seen by neuro, who notes a possible CVA, rec an MRI of the brain to assess for cerebellar or internal capsule infarct, continue antiplatelet and statin, OT, PT.  MRI of brain revealed: mild periventricular and scattered subcortical white matter ischemia.   No acute cerebral cortical infarct is found.   No intracranial hemorrhage is recognized. No mass effect is found in the brain.    Patient has been dysphagia diet-reg solids with thickened liquids, progressive dysphagia since 2016, only recently sought intervention.  Had an esophagram in 5/2021:  "Apparent filling defect in the hypopharynx, differential diagnosis includes fibromuscular polyp. Recommend follow-up as clinically endoscopy or CT of neck with oral and IV contrast can be ordered as clinically indicated. Slightly delayed oral phase, patient has parkinsonism. Follow-up modified barium swallowing study can be ordered as clinically indicated".       Dysphagia w/u: MBS, no obvious obstructive lesion noted.  ENT:  Normal fiberoptic laryngoscopy.  No pharyngeal polyp or diverticula noted.  Per ENT, dysphagia thought to be 2/2 progression of Parkinsons and recent CVA. Seen by GI; patient has declined PEG placement.  For goals of care.    Patient shares that he came to the hospital, as he found it suddenly difficult to use his right fingers to  grapes and get them into his mouth.  This was associated with numbness in his right fingers (since resolved).  He states that his wife had noticed he had difficulty getting food into his mouth: it was hitting the side of his face, and this prompted him to call 911.    For goals of care.    PERTINENT PMH REVIEWED: Yes     PAST MEDICAL & SURGICAL HISTORY:  Parkinson disease    Glaucoma    HTN (hypertension)    History of BPH    No significant past surgical history        SOCIAL HISTORY: .  Three children.  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  Worked as an , retired about 1 year ago.  No longer drives.                                   Admitted from:  home     Surrogate/HCP/Guardian: HCP named on visit: wife, Berta Vizcaino: [phone]; alt: Sarai Kaufman:     FAMILY HISTORY:  No pertinent family history in first degree relatives        Baseline ADLs (prior to admission): assist with ADLs.  Dependence for IADLS requiring physical activity.  Intact cognitively.  Independent/ Dependent      Allergies    No Known Allergies    Intolerances        Present Symptoms:     Dyspnea: denies  Nausea/Vomiting: Yes No  Anxiety:  Yes No  Depression: Yes No  Fatigue: Yes No  Loss of appetite: Yes No    Pain:             Character-            Duration-            Effect-            Factors-            Frequency-            Location-            Severity-    Review of Systems: Reviewed                     Negative:                     Positive:  Unable to obtain due to poor mentation   All others negative    MEDICATIONS  (STANDING):  acetaZOLAMIDE    Tablet 250 milliGRAM(s) Oral at bedtime  aspirin  chewable 81 milliGRAM(s) Oral daily  atorvastatin 80 milliGRAM(s) Oral at bedtime  brimonidine 0.2% Ophthalmic Solution 1 Drop(s) Both EYES three times a day  carbidopa/levodopa  25/100 2 Tablet(s) Oral daily  carbidopa/levodopa  25/100 1.5 Tablet(s) Oral <User Schedule>  carbidopa/levodopa  25/100 1.5 Tablet(s) Oral at bedtime  dextrose 5% + lactated ringers. 1000 milliLiter(s) (60 mL/Hr) IV Continuous <Continuous>  heparin   Injectable 5000 Unit(s) SubCutaneous every 12 hours  latanoprost 0.005% Ophthalmic Solution 1 Drop(s) Both EYES at bedtime  metoprolol succinate  milliGRAM(s) Oral daily  pilocarpine 1% Solution 1 Drop(s) Right EYE three times a day  selegiline Oral Tab/Cap 5 milliGRAM(s) Oral two times a day  tamsulosin 0.4 milliGRAM(s) Oral at bedtime  timolol 0.5% Solution 1 Drop(s) Both EYES two times a day    MEDICATIONS  (PRN):      PHYSICAL EXAM:    Vital Signs Last 24 Hrs  T(C): 36.9 (04 Jun 2021 08:29), Max: 36.9 (04 Jun 2021 08:29)  T(F): 98.4 (04 Jun 2021 08:29), Max: 98.4 (04 Jun 2021 08:29)  HR: 66 (04 Jun 2021 08:29) (58 - 66)  BP: 101/63 (04 Jun 2021 08:29) (101/63 - 161/79)  BP(mean): --  RR: 18 (04 Jun 2021 08:29) (18 - 18)  SpO2: 99% (04 Jun 2021 08:29) (96% - 100%)      Karnofsky:  %    Gen: In NAD.  No pain behaviors or work of breathing noted  Neuro: alert and oriented x 3, awake, alert, communicates needs.  Lethargic, rouses briefly to verbal/tactile stim and returns to sleep  Head: NC/AT, bitemporal wasting  Eyes: sclerae non-icteric  ENT: moist oral mucosa, dry oral mucosa  Resp: clear, unlabored  CV: S1, S2  Abd: soft, non-tender, + bowels sounds  : hernandez bag/external urine collection system without gross hematuria  Peripheral Vasc: no pedal edema, anasarca, +b/l LE edema, cool LE digits  Int: warm and dry  Psych: no psychomotor agitation        LABS:                        11.9   5.42  )-----------( 201      ( 04 Jun 2021 06:15 )             36.9     06-04    144  |  113<H>  |  17.2  ----------------------------<  91  3.9   |  22.0  |  1.20    Ca    9.5      04 Jun 2021 06:15  Mg     2.1     06-04          I&O's Summary      RADIOLOGY & ADDITIONAL STUDIES:    ADVANCE DIRECTIVES:   DNR YES NO  Completed on:                     MOLST  YES NO   Completed on:  Living Will  YES NO   Completed on:     HPI:  74 y/o male with hx of glaucoma, HTN, Parkinsons who resides at home with his wife. He uses a cane at baseline, and admits to being on dysphagia diet. Patient noted right hand was " not working well around 4pm yesterday." Then around 7pm his wife noted he wasnt putting the fork in the right spot while eating dinner, the food was hitting his cheek. Denies any HA, CP, SOB, palpations, hx of afib. Denies any falls. In the ED patient with CT/A/P without acute findings or LVO. NIH 3. Evaluated by Tele-stroke and not a TPA candidate based on LKW and low NIH. Of note patient with normal glucose on BMP, no hx of DM, and no neuro-glycopenic symptoms, however repeat accu checks with erroneous low readings.  (01 Jun 2021 03:09)    HPI: 74 y/o male with hx of HTN, glaucoma, Parkinsons (resides at home with wife, uses cane at baseline, on dysphagia diet) presents to [facility] after noting that his right hand was " not working well around 4pm yesterday." Per admission notes, wife noted that he wasn't putting the fork in the right spot while eating dinner, and that food was hitting his cheek. Denied any HA, CP, SOB, palpations, hx of afib, falls. In the ED patient's CT/A/P without acute findings or LVO. NIH 3. Evaluated by Tele-stroke and not a TPA candidate based on LKW and low NIH.  Of note, patient with normal glucose on BMP, no hx of DM, and no neuro-glycopenic symptoms, however repeat accu checks with erroneous low readings.    CT of the head negative, CTA head/neck negative.  Admitted to r/o CVA.      Seen by neuro, who notes a possible CVA, rec an MRI of the brain to assess for cerebellar or internal capsule infarct, continue antiplatelet and statin, OT, PT.  MRI of brain revealed: mild periventricular and scattered subcortical white matter ischemia.   No acute cerebral cortical infarct is found.   No intracranial hemorrhage is recognized. No mass effect is found in the brain.    Patient has been on a dysphagia diet-reg solids with thickened liquids, progressive dysphagia since 2016, only recently sought intervention.  Had an esophagram in 5/2021:  "Apparent filling defect in the hypopharynx, differential diagnosis includes fibromuscular polyp. Recommend follow-up as clinically endoscopy or CT of neck with oral and IV contrast can be ordered as clinically indicated. Slightly delayed oral phase, patient has parkinsonism. Follow-up modified barium swallowing study can be ordered as clinically indicated".       Dysphagia w/u: MBS, no obvious obstructive lesion noted.  ENT:  Normal fiberoptic laryngoscopy.  No pharyngeal polyp or diverticula noted.  Seen by GI; patient has declined PEG placement.  For goals of care.    Patient shares that he came to the hospital, as he found it suddenly difficult to use his right fingers to  grapes and get them into his mouth.  This was associated with numbness in his right fingers (since resolved).  He states that his wife had noticed he had difficulty getting food into his mouth: it was hitting the side of his face, and this prompted him to call 911.  Reports that liquids are challenging to swallow, resulting in modification of diet.    For goals of care.    PERTINENT PMH REVIEWED: Yes     PAST MEDICAL & SURGICAL HISTORY:  Parkinson disease    Glaucoma    HTN (hypertension)    History of BPH    No significant past surgical history        SOCIAL HISTORY: .  Three children.  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  No longer drives.                                   Admitted from:  home     Surrogate/HCP/Guardian: HCP named on visit: wife, Berta Vizcaino: [phone]; alt: Sarai Kaufman:     FAMILY HISTORY:  No pertinent family history in first degree relatives        Baseline ADLs (prior to admission): assist with ADLs.  Dependence for IADLS requiring physical activity.  Intact cognitively.  Independent/ Dependent      Allergies    No Known Allergies    Intolerances        Present Symptoms:     Dyspnea: denies  Nausea/Vomiting: denies  Anxiety: denies  Depression: denies  Fatigue: denies  Loss of appetite: currently NPO    Pain: denies      Review of Systems: Reviewed: + for constipation (last BM: Sunday), weakness, vision limitation (hx of glaucoma). Negative for headache, numbness, CP, palpitations.  All others negative    MEDICATIONS  (STANDING):  acetaZOLAMIDE    Tablet 250 milliGRAM(s) Oral at bedtime  aspirin  chewable 81 milliGRAM(s) Oral daily  atorvastatin 80 milliGRAM(s) Oral at bedtime  brimonidine 0.2% Ophthalmic Solution 1 Drop(s) Both EYES three times a day  carbidopa/levodopa  25/100 2 Tablet(s) Oral daily  carbidopa/levodopa  25/100 1.5 Tablet(s) Oral <User Schedule>  carbidopa/levodopa  25/100 1.5 Tablet(s) Oral at bedtime  dextrose 5% + lactated ringers. 1000 milliLiter(s) (60 mL/Hr) IV Continuous <Continuous>  heparin   Injectable 5000 Unit(s) SubCutaneous every 12 hours  latanoprost 0.005% Ophthalmic Solution 1 Drop(s) Both EYES at bedtime  metoprolol succinate  milliGRAM(s) Oral daily  pilocarpine 1% Solution 1 Drop(s) Right EYE three times a day  selegiline Oral Tab/Cap 5 milliGRAM(s) Oral two times a day  tamsulosin 0.4 milliGRAM(s) Oral at bedtime  timolol 0.5% Solution 1 Drop(s) Both EYES two times a day    MEDICATIONS  (PRN):      PHYSICAL EXAM:    Vital Signs Last 24 Hrs  T(C): 36.9 (04 Jun 2021 08:29), Max: 36.9 (04 Jun 2021 08:29)  T(F): 98.4 (04 Jun 2021 08:29), Max: 98.4 (04 Jun 2021 08:29)  HR: 66 (04 Jun 2021 08:29) (58 - 66)  BP: 101/63 (04 Jun 2021 08:29) (101/63 - 161/79)  BP(mean): --  RR: 18 (04 Jun 2021 08:29) (18 - 18)  SpO2: 99% (04 Jun 2021 08:29) (96% - 100%)      Karnofsky: 30 %    Gen: In NAD.  No pain behaviors or work of breathing noted  Neuro: alert and oriented x 4, communicates needs.   Head: NC/AT  Eyes: sclerae non-icteric  ENT: hypophonia, dry oral mucosa  Resp: clear, unlabored  CV: S1, S2  GI: soft, non-tender, + bowels sounds  Peripheral Vasc: warm  Int: warm and dry  Psych: no psychomotor agitation        LABS:                        11.9   5.42  )-----------( 201      ( 04 Jun 2021 06:15 )             36.9     06-04    144  |  113<H>  |  17.2  ----------------------------<  91  3.9   |  22.0  |  1.20    Ca    9.5      04 Jun 2021 06:15  Mg     2.1     06-04          I&O's Summary      RADIOLOGY & ADDITIONAL STUDIES:    ADVANCE DIRECTIVES:   DNR YES NO  Completed on:                     MOLST  YES NO   Completed on:  Living Will  YES NO   Completed on:     HPI:  76 y/o male with hx of glaucoma, HTN, Parkinsons who resides at home with his wife. He uses a cane at baseline, and admits to being on dysphagia diet. Patient noted right hand was " not working well around 4pm yesterday." Then around 7pm his wife noted he wasnt putting the fork in the right spot while eating dinner, the food was hitting his cheek. Denies any HA, CP, SOB, palpations, hx of afib. Denies any falls. In the ED patient with CT/A/P without acute findings or LVO. NIH 3. Evaluated by Tele-stroke and not a TPA candidate based on LKW and low NIH. Of note patient with normal glucose on BMP, no hx of DM, and no neuro-glycopenic symptoms, however repeat accu checks with erroneous low readings.  (01 Jun 2021 03:09)    HPI: 76 y/o male with hx of HTN, glaucoma, Parkinsons (resides at home with wife, uses cane at baseline, on dysphagia diet) presents to [facility] after noting that his right hand was " not working well around 4pm yesterday." Per admission notes, wife noted that he wasn't putting the fork in the right spot while eating dinner, and that food was hitting his cheek. Denied any HA, CP, SOB, palpations, hx of afib, falls. In the ED patient's CT/A/P without acute findings or LVO. NIH 3. Evaluated by Tele-stroke and not a TPA candidate based on LKW and low NIH.  Of note, patient with normal glucose on BMP, no hx of DM, and no neuro-glycopenic symptoms, however repeat accu checks with erroneous low readings.    CT of the head negative, CTA head/neck negative.  Admitted to r/o CVA.      Seen by neuro, who notes a possible CVA, rec an MRI of the brain to assess for cerebellar or internal capsule infarct, continue antiplatelet and statin, OT, PT.  MRI of brain revealed: mild periventricular and scattered subcortical white matter ischemia.   No acute cerebral cortical infarct is found.   No intracranial hemorrhage is recognized. No mass effect is found in the brain.    Patient has been on a dysphagia diet-reg solids with thickened liquids, progressive dysphagia since 2016, only recently sought intervention.  Had an esophagram in 5/2021:  "Apparent filling defect in the hypopharynx, differential diagnosis includes fibromuscular polyp. Recommend follow-up as clinically endoscopy or CT of neck with oral and IV contrast can be ordered as clinically indicated. Slightly delayed oral phase, patient has parkinsonism. Follow-up modified barium swallowing study can be ordered as clinically indicated".       Dysphagia w/u: MBS, no obvious obstructive lesion noted.  ENT:  Normal fiberoptic laryngoscopy.  No pharyngeal polyp or diverticula noted.  Seen by GI; patient has declined PEG placement.  For goals of care.    Patient shares that he came to the hospital, as he found it suddenly difficult to use his right fingers to  grapes and get them into his mouth.  This was associated with numbness in his right fingers (since resolved).  He states that his wife had noticed he had difficulty getting food into his mouth: it was hitting the side of his face, and this prompted him to call 911.  Reports that liquids are challenging to swallow, resulting in modification of diet.    For goals of care.    PERTINENT PMH REVIEWED: Yes     PAST MEDICAL & SURGICAL HISTORY:  Parkinson disease    Glaucoma    HTN (hypertension)    History of BPH    No significant past surgical history        SOCIAL HISTORY: .  Three children.  Retired from the [employer], [employer] of Mental Health.  Worked as an , retired about 1 year ago.  No longer drives.                                   Admitted from:  home     Surrogate/HCP/Guardian: HCP named on visit: wife, Berta Vizcaino: [phone]; alt: Sarai Kaufman:     FAMILY HISTORY:  No pertinent family history in first degree relatives        Baseline ADLs (prior to admission): assist with ADLs.  Dependence for IADLS requiring physical activity.  Intact cognitively.    Allergies    No Known Allergies    Intolerances        Present Symptoms:     Dyspnea: denies  Nausea/Vomiting: denies  Anxiety: denies  Depression: denies  Fatigue: denies  Loss of appetite: currently NPO    Pain: denies      Review of Systems: Reviewed: + for constipation (last BM: Sunday), weakness, vision limitation (hx of glaucoma). Negative for headache, numbness, CP, palpitations.  All others negative    MEDICATIONS  (STANDING):  acetaZOLAMIDE    Tablet 250 milliGRAM(s) Oral at bedtime  aspirin  chewable 81 milliGRAM(s) Oral daily  atorvastatin 80 milliGRAM(s) Oral at bedtime  brimonidine 0.2% Ophthalmic Solution 1 Drop(s) Both EYES three times a day  carbidopa/levodopa  25/100 2 Tablet(s) Oral daily  carbidopa/levodopa  25/100 1.5 Tablet(s) Oral <User Schedule>  carbidopa/levodopa  25/100 1.5 Tablet(s) Oral at bedtime  dextrose 5% + lactated ringers. 1000 milliLiter(s) (60 mL/Hr) IV Continuous <Continuous>  heparin   Injectable 5000 Unit(s) SubCutaneous every 12 hours  latanoprost 0.005% Ophthalmic Solution 1 Drop(s) Both EYES at bedtime  metoprolol succinate  milliGRAM(s) Oral daily  pilocarpine 1% Solution 1 Drop(s) Right EYE three times a day  selegiline Oral Tab/Cap 5 milliGRAM(s) Oral two times a day  tamsulosin 0.4 milliGRAM(s) Oral at bedtime  timolol 0.5% Solution 1 Drop(s) Both EYES two times a day    MEDICATIONS  (PRN):      PHYSICAL EXAM:    Vital Signs Last 24 Hrs  T(C): 36.9 (04 Jun 2021 08:29), Max: 36.9 (04 Jun 2021 08:29)  T(F): 98.4 (04 Jun 2021 08:29), Max: 98.4 (04 Jun 2021 08:29)  HR: 66 (04 Jun 2021 08:29) (58 - 66)  BP: 101/63 (04 Jun 2021 08:29) (101/63 - 161/79)  BP(mean): --  RR: 18 (04 Jun 2021 08:29) (18 - 18)  SpO2: 99% (04 Jun 2021 08:29) (96% - 100%)      Karnofsky: 30 %    Gen: In NAD.  No pain behaviors or work of breathing noted  Neuro: alert and oriented x 4, communicates needs, moves all extremities.    Head: NC/AT  Eyes: sclerae non-icteric  ENT: hypophonia, dry oral mucosa  Resp: clear, unlabored  CV: S1, S2  GI: soft, non-tender, + bowels sounds  Peripheral Vasc: warm  Int: warm and dry  Psych: no psychomotor agitation        LABS:                        11.9   5.42  )-----------( 201      ( 04 Jun 2021 06:15 )             36.9     06-04    144  |  113<H>  |  17.2  ----------------------------<  91  3.9   |  22.0  |  1.20    Ca    9.5      04 Jun 2021 06:15  Mg     2.1     06-04          I&O's Summary      RADIOLOGY & ADDITIONAL STUDIES: reviewed    ADVANCE DIRECTIVES: full code orders       HPI:  74 y/o male with hx of glaucoma, HTN, Parkinsons who resides at home with his wife. He uses a cane at baseline, and admits to being on dysphagia diet. Patient noted right hand was " not working well around 4pm yesterday." Then around 7pm his wife noted he wasnt putting the fork in the right spot while eating dinner, the food was hitting his cheek. Denies any HA, CP, SOB, palpations, hx of afib. Denies any falls. In the ED patient with CT/A/P without acute findings or LVO. NIH 3. Evaluated by Tele-stroke and not a TPA candidate based on LKW and low NIH. Of note patient with normal glucose on BMP, no hx of DM, and no neuro-glycopenic symptoms, however repeat accu checks with erroneous low readings.  (01 Jun 2021 03:09)    HPI: 74 y/o male with hx of HTN, glaucoma, Parkinsons (resides at home with wife, uses cane at baseline, on dysphagia diet) presents to [facility] after noting that his right hand was " not working well around 4pm yesterday." Per admission notes, wife noted that he wasn't putting the fork in the right spot while eating dinner, and that food was hitting his cheek. Denied any HA, CP, SOB, palpations, hx of afib, falls. In the ED patient's CT/A/P without acute findings or LVO. NIH 3. Evaluated by Tele-stroke and not a TPA candidate based on LKW and low NIH.  Of note, patient with normal glucose on BMP, no hx of DM, and no neuro-glycopenic symptoms, however repeat accu checks with erroneous low readings.    CT of the head negative, CTA head/neck negative.  Admitted to r/o CVA.      Seen by neuro, who notes a possible CVA, rec an MRI of the brain to assess for cerebellar or internal capsule infarct, continue antiplatelet and statin, OT, PT.  MRI of brain revealed: mild periventricular and scattered subcortical white matter ischemia.   No acute cerebral cortical infarct is found.   No intracranial hemorrhage is recognized. No mass effect is found in the brain.    Patient has been on a dysphagia diet-reg solids with thickened liquids, progressive dysphagia since 2016, only recently sought intervention.  Had an esophagram in 5/2021:  "Apparent filling defect in the hypopharynx, differential diagnosis includes fibromuscular polyp. Recommend follow-up as clinically endoscopy or CT of neck with oral and IV contrast can be ordered as clinically indicated. Slightly delayed oral phase, patient has parkinsonism. Follow-up modified barium swallowing study can be ordered as clinically indicated".       Dysphagia w/u: MBS, no obvious obstructive lesion noted.  ENT:  Normal fiberoptic laryngoscopy.  No pharyngeal polyp or diverticula noted.  Seen by GI; patient has declined PEG placement.  For goals of care.    Patient shares that he came to the hospital, as he found it suddenly difficult to use his right fingers to  grapes and get them into his mouth.  This was associated with numbness in his right fingers (since resolved).  He states that his wife had noticed he had difficulty getting food into his mouth: it was hitting the side of his face, and this prompted him to call 911.  Reports that liquids are challenging to swallow, resulting in modification of diet.    For goals of care.    PERTINENT PMH REVIEWED: Yes     PAST MEDICAL & SURGICAL HISTORY:  Parkinson disease    Glaucoma    HTN (hypertension)    History of BPH    No significant past surgical history        SOCIAL HISTORY: .  Three children.  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  Worked as an , retired about 1 year ago.  No longer drives.                                   Admitted from:  home     Surrogate/HCP/Guardian: HCP named on visit: wife, Berta Vizcaino: [phone]; alt: Sarai Kaufman: [phone]    FAMILY HISTORY:  No pertinent family history in first degree relatives        Baseline ADLs (prior to admission): assist with ADLs.  Dependence for IADLS requiring physical activity.  Intact cognitively.    Allergies    No Known Allergies    Intolerances        Present Symptoms:     Dyspnea: denies  Nausea/Vomiting: denies  Anxiety: denies  Depression: denies  Fatigue: denies  Loss of appetite: currently NPO    Pain: denies      Review of Systems: Reviewed: + for constipation (last BM: Sunday), weakness, vision limitation (hx of glaucoma). Negative for headache, numbness, CP, palpitations.  All others negative    MEDICATIONS  (STANDING):  acetaZOLAMIDE    Tablet 250 milliGRAM(s) Oral at bedtime  aspirin  chewable 81 milliGRAM(s) Oral daily  atorvastatin 80 milliGRAM(s) Oral at bedtime  brimonidine 0.2% Ophthalmic Solution 1 Drop(s) Both EYES three times a day  carbidopa/levodopa  25/100 2 Tablet(s) Oral daily  carbidopa/levodopa  25/100 1.5 Tablet(s) Oral <User Schedule>  carbidopa/levodopa  25/100 1.5 Tablet(s) Oral at bedtime  dextrose 5% + lactated ringers. 1000 milliLiter(s) (60 mL/Hr) IV Continuous <Continuous>  heparin   Injectable 5000 Unit(s) SubCutaneous every 12 hours  latanoprost 0.005% Ophthalmic Solution 1 Drop(s) Both EYES at bedtime  metoprolol succinate  milliGRAM(s) Oral daily  pilocarpine 1% Solution 1 Drop(s) Right EYE three times a day  selegiline Oral Tab/Cap 5 milliGRAM(s) Oral two times a day  tamsulosin 0.4 milliGRAM(s) Oral at bedtime  timolol 0.5% Solution 1 Drop(s) Both EYES two times a day    MEDICATIONS  (PRN):      PHYSICAL EXAM:    Vital Signs Last 24 Hrs  T(C): 36.9 (04 Jun 2021 08:29), Max: 36.9 (04 Jun 2021 08:29)  T(F): 98.4 (04 Jun 2021 08:29), Max: 98.4 (04 Jun 2021 08:29)  HR: 66 (04 Jun 2021 08:29) (58 - 66)  BP: 101/63 (04 Jun 2021 08:29) (101/63 - 161/79)  BP(mean): --  RR: 18 (04 Jun 2021 08:29) (18 - 18)  SpO2: 99% (04 Jun 2021 08:29) (96% - 100%)      Karnofsky: 30 %    Gen: In NAD.  No pain behaviors or work of breathing noted  Neuro: alert and oriented x 4, communicates needs, moves all extremities.    Head: NC/AT  Eyes: sclerae non-icteric  ENT: hypophonia, dry oral mucosa  Resp: clear, unlabored  CV: S1, S2  GI: soft, non-tender, + bowels sounds  Peripheral Vasc: warm  Int: warm and dry  Psych: no psychomotor agitation        LABS:                        11.9   5.42  )-----------( 201      ( 04 Jun 2021 06:15 )             36.9     06-04    144  |  113<H>  |  17.2  ----------------------------<  91  3.9   |  22.0  |  1.20    Ca    9.5      04 Jun 2021 06:15  Mg     2.1     06-04          I&O's Summary      RADIOLOGY & ADDITIONAL STUDIES: reviewed    ADVANCE DIRECTIVES: full code orders

## 2021-06-04 NOTE — PROGRESS NOTE ADULT - SUBJECTIVE AND OBJECTIVE BOX
ANASTACIO RAMEY    [id number]    75y      Male    CC: R hand weakness     INTERVAL HPI/OVERNIGHT EVENTS: pt seen and examined. oob to chair. reports improvement in strength and coordination.     REVIEW OF SYSTEMS:    CONSTITUTIONAL: No fever, weight loss  RESPIRATORY: No cough, wheezing, hemoptysis; No shortness of breath  CARDIOVASCULAR: No chest pain, palpitations  GASTROINTESTINAL: No abdominal or epigastric pain. No nausea, vomiting  NEUROLOGICAL: No headaches    Vital Signs Last 24 Hrs  T(C): 36.9 (04 Jun 2021 08:29), Max: 36.9 (04 Jun 2021 08:29)  T(F): 98.4 (04 Jun 2021 08:29), Max: 98.4 (04 Jun 2021 08:29)  HR: 66 (04 Jun 2021 08:29) (58 - 66)  BP: 101/63 (04 Jun 2021 08:29) (101/63 - 161/79)  BP(mean): --  RR: 18 (04 Jun 2021 08:29) (18 - 18)  SpO2: 99% (04 Jun 2021 08:29) (96% - 100%)    PHYSICAL EXAM:    GENERAL: NAD  HEENT: PERRL, +EOMI  NECK: soft, supple  CHEST/LUNG: Clear to auscultation bilaterally; No wheezing  HEART: S1S2+, Regular rate and rhythm  ABDOMEN: Soft, Nontender, Nondistended; Bowel sounds present  SKIN: warm, dry  NEURO: AAOX3, sensory intact, muscle strength 5/5 in b/l UE and LE, follows all commands   PSYCH: normal affect    LABS:                        11.9   5.42  )-----------( 201      ( 04 Jun 2021 06:15 )             36.9     06-04    144  |  113<H>  |  17.2  ----------------------------<  91  3.9   |  22.0  |  1.20    Ca    9.5      04 Jun 2021 06:15  Mg     2.1     06-04              MEDICATIONS  (STANDING):  acetaZOLAMIDE    Tablet 250 milliGRAM(s) Oral at bedtime  aspirin  chewable 81 milliGRAM(s) Oral daily  atorvastatin 80 milliGRAM(s) Oral at bedtime  brimonidine 0.2% Ophthalmic Solution 1 Drop(s) Both EYES three times a day  carbidopa/levodopa  25/100 2 Tablet(s) Oral daily  carbidopa/levodopa  25/100 1.5 Tablet(s) Oral <User Schedule>  carbidopa/levodopa  25/100 1.5 Tablet(s) Oral at bedtime  dextrose 5% + lactated ringers. 1000 milliLiter(s) (60 mL/Hr) IV Continuous <Continuous>  heparin   Injectable 5000 Unit(s) SubCutaneous every 12 hours  latanoprost 0.005% Ophthalmic Solution 1 Drop(s) Both EYES at bedtime  metoprolol succinate  milliGRAM(s) Oral daily  pilocarpine 1% Solution 1 Drop(s) Right EYE three times a day  selegiline Oral Tab/Cap 5 milliGRAM(s) Oral two times a day  tamsulosin 0.4 milliGRAM(s) Oral at bedtime  timolol 0.5% Solution 1 Drop(s) Both EYES two times a day    MEDICATIONS  (PRN):      RADIOLOGY & ADDITIONAL TESTS:    < from: MR Head No Cont (06.04.21 @ 09:20) >    IMPRESSION:   mild periventricular and scattered subcortical white matter ischemia.    < end of copied text >

## 2021-06-04 NOTE — PROGRESS NOTE ADULT - SUBJECTIVE AND OBJECTIVE BOX
Patient seen and examined;  events noted.  ENT eval negative.  No Pharyngeal polyp.  Still NPO.  Still refusing PEG insertion.  Paliative care notes appreciated.    Dobhoff feeding tube never placed.  Pt wishes to eat and accept the consequences.      PAST MEDICAL & SURGICAL HISTORY:  Parkinson disease    Glaucoma    HTN (hypertension)    History of BPH    No significant past surgical history        ROS:  No Heartburn, regurgitation, dysphagia, odynophagia.  No dyspepsia  No abdominal pain.    No Nausea, vomiting.  No Bleeding.  No hematemesis.   No diarrhea.    No hematochesia.  No weight loss, anorexia.  No edema.      MEDICATIONS  (STANDING):  acetaZOLAMIDE    Tablet 250 milliGRAM(s) Oral at bedtime  aspirin  chewable 81 milliGRAM(s) Oral daily  atorvastatin 80 milliGRAM(s) Oral at bedtime  bisacodyl Suppository 10 milliGRAM(s) Rectal once  brimonidine 0.2% Ophthalmic Solution 1 Drop(s) Both EYES three times a day  carbidopa/levodopa  25/100 2 Tablet(s) Oral daily  carbidopa/levodopa  25/100 1.5 Tablet(s) Oral <User Schedule>  carbidopa/levodopa  25/100 1.5 Tablet(s) Oral at bedtime  dextrose 5% + lactated ringers. 1000 milliLiter(s) (60 mL/Hr) IV Continuous <Continuous>  heparin   Injectable 5000 Unit(s) SubCutaneous every 12 hours  latanoprost 0.005% Ophthalmic Solution 1 Drop(s) Both EYES at bedtime  metoprolol succinate  milliGRAM(s) Oral daily  pilocarpine 1% Solution 1 Drop(s) Right EYE three times a day  selegiline Oral Tab/Cap 5 milliGRAM(s) Oral two times a day  tamsulosin 0.4 milliGRAM(s) Oral at bedtime  timolol 0.5% Solution 1 Drop(s) Both EYES two times a day    MEDICATIONS  (PRN):      Allergies    No Known Allergies    Intolerances        Vital Signs Last 24 Hrs  T(C): 36.5 (04 Jun 2021 15:33), Max: 36.9 (04 Jun 2021 08:29)  T(F): 97.7 (04 Jun 2021 15:33), Max: 98.4 (04 Jun 2021 08:29)  HR: 64 (04 Jun 2021 15:33) (64 - 66)  BP: 95/60 (04 Jun 2021 15:33) (95/60 - 161/79)  BP(mean): --  RR: 20 (04 Jun 2021 15:33) (18 - 20)  SpO2: 99% (04 Jun 2021 08:29) (96% - 100%)    PHYSICAL EXAM:    GENERAL: NAD, well-groomed, well-developed  HEAD:  Atraumatic, Normocephalic  EYES: EOMI, PERRLA, conjunctiva and sclera clear  ENMT: No tonsillar erythema, exudates, or enlargement; Moist mucous membranes, Good dentition, No lesions  NECK: Supple, No JVD, Normal thyroid  CHEST/LUNG: Clear to percussion bilaterally; No rales, rhonchi, wheezing, or rubs  HEART: Regular rate and rhythm; No murmurs, rubs, or gallops  ABDOMEN: Soft, Nontender, Nondistended; Bowel sounds present  EXTREMITIES:  2+ Peripheral Pulses, No clubbing, cyanosis, or edema  LYMPH: No lymphadenopathy noted  SKIN: No rashes or lesions      LABS:                        11.9   5.42  )-----------( 201      ( 04 Jun 2021 06:15 )             36.9     06-04    144  |  113<H>  |  17.2  ----------------------------<  91  3.9   |  22.0  |  1.20    Ca    9.5      04 Jun 2021 06:15  Mg     2.1     06-04               RADIOLOGY & ADDITIONAL STUDIES:

## 2021-06-04 NOTE — CONSULT NOTE ADULT - PROBLEM SELECTOR RECOMMENDATION 9
-CT brain unremarkable  -CT perfusion: Normal perfusion..  -CTA brain/neck:: Patent intracranial circulation. No flow-limiting stenosis or occlusion.  -CTA neck: Patent cervical vasculature. No flow limiting stenosis or occlusion.  -MRI: no acute infarct or hemorrhage  -seen by neuro  -On ASA, statin -CVA ruled out.  -CT brain unremarkable  -CT perfusion: Normal perfusion..  -CTA brain/neck:: Patent intracranial circulation. No flow-limiting stenosis or occlusion.  -CTA neck: Patent cervical vasculature. No flow limiting stenosis or occlusion.  -MRI: no acute infarct or hemorrhage  -seen by neuro  -On ASA, statin

## 2021-06-04 NOTE — CONSULT NOTE ADULT - ASSESSMENT
74y/o M PMH Parkinsons dx, HTN, glaucoma, BPH admitted for r/o CVA.     R sided weakness   Parkinson's dx   CVA ruled out   dysphagia   -CTH negative  -CTA head/neck negative   -MRI: mild periventricular and scattered subcortical white matter ischemia; no acute infarct or hemorrhage  -TTE WNL  -Cont asa, statin   -Neuro following   -Fall risk protocol   -PT eval: home with assist, home PT, RW  -Cont home meds   -Seen in consult by S&S. S/p MBS 6/2 of which recommendations are for NPO. Esophagram was performed on 5/3 as an outpatient which noted a possible filling defect in the hypopharynx/poss fibromuscular polyp.  Possible that hypopharynx defect is worsening patients baseline dysphagia.   -GI consult appreciated   -ENT recs appreciated  -s/p laryngoscopy 6/3: no pharyngeal polyp or diverticula noted   -d/w pt and pt's family at bedside, do not want PEG or NGT at this time   -palliative consult    #Hypoglycemia   -Resolved  -Hgba1c 5.5  -Not on DM meds, no glycopenic symptoms, no change with dextrose IV, likely false readings; glucose wnl on repeat bmp   -Cont to monitor   -cont IVF w/ D5 while pt remains NPO  -Accu checks Q6 while NPO    #HTN  -Continue toprol      #Glaucoma   -Cont eye drops     #BPH   -Cont flomax  74y/o M with a history of HTN, Parkinsons diseas, glaucoma, presents right sided weakness, admitted to r/o CVA.  + Dysphagia   74y/o M with a history of HTN, Parkinsons disease, glaucoma, presents with right sided weakness, admitted to r/o CVA.  + Dysphagia

## 2021-06-04 NOTE — PROGRESS NOTE ADULT - ASSESSMENT
76y/o M PMH Parkinsons dx, HTN, glaucoma, BPH admitted for r/o CVA.     R sided weakness   Parkinson's dx   CVA ruled out   dysphagia   -CTH negative  -CTA head/neck negative   -MRI: mild periventricular and scattered subcortical white matter ischemia; no acute infarct or hemorrhage  -TTE WNL  -Cont asa, statin   -Neuro following   -Fall risk protocol   -PT eval: home with assist, home PT, RW  -Cont home meds   -Seen in consult by S&S. S/p MBS 6/2 of which recommendations are for NPO. Esophagram was performed on 5/3 as an outpatient which noted a possible filling defect in the hypopharynx/poss fibromuscular polyp.  Possible that hypopharynx defect is worsening patients baseline dysphagia.   -GI consult appreciated   -ENT recs appreciated  -s/p laryngoscopy 6/3: no pharyngeal polyp or diverticula noted   -d/w pt and pt's family at bedside, do not want PEG or NGT at this time   -palliative consult    #Hypoglycemia   -Resolved  -Hgba1c 5.5  -Not on DM meds, no glycopenic symptoms, no change with dextrose IV, likely false readings; glucose wnl on repeat bmp   -Cont to monitor   -cont IVF w/ D5 while pt remains NPO  -Accu checks Q6 while NPO    #HTN  -Continue toprol      #Glaucoma   -Cont eye drops     #BPH   -Cont flomax

## 2021-06-04 NOTE — CHART NOTE - NSCHARTNOTEFT_GEN_A_CORE
Pt's daughter Sarai ([phone]) called for update - she is upset that no one has contacted her   Permission to speak with patient noted in palliative care note from earlier today   She is upset that her father has been NPO for the past two days, states that he wants to eat and should be permitted to despite risk of aspiration   Explained that primary team should make determination regarding PO status in AM   All questions answered

## 2021-06-04 NOTE — CONSULT NOTE ADULT - PROBLEM SELECTOR RECOMMENDATION 5
-Introduced role of palliative care in symptom management, care planning, support, and transitions in care to those with serious illness.  Emotional support offered.  -Reviewed role of HCP: patient assigns wife, Berta as primary, Sarai haynes as alternate  -Full code orders.  States that he is [employer], believes God is active in his life and will take him when he is ready.  Inquired if heart stops, if that is God calling him.  States that his bashir is not inconsistent with CPR and states that he is considering CPR and intubation.  Wishes to maintain full code orders.  -Wishes to maintain a PO diet.  Would collaborate with SLP to determine safest diet, with aspiration precautions taken, if patient maintains desire for PO diet, reiterating known risk of aspiration and sequelae.  -Patient's current goals: to get stronger, better, inconsistent with a hospice/comfort focused plan.  Reviewed progressive nature of Parkinson's disease.  Introduced hospice as a framework of support when patient wishes to focus more on comfort.  -Patient amenable to writer reaching out to wife, daughter, Sarai.  Unable to reach.  Messages left  -Palliative care will follow for support.    Total time spent in conversation re: advanced care plannin min.    Pt reviewed with Dr. hSannon, RN -supportive care  -states he wishes to get stronger, better  -PT

## 2021-06-04 NOTE — CONSULT NOTE ADULT - PROBLEM SELECTOR RECOMMENDATION 4
-supportive care  -states he wishes to get stronger, better  -PT consider bisacodyl suppository 10 mg MA x 1.  No BM since Sunday.

## 2021-06-05 LAB
GLUCOSE BLDC GLUCOMTR-MCNC: 130 MG/DL — HIGH (ref 70–99)
GLUCOSE BLDC GLUCOMTR-MCNC: 73 MG/DL — SIGNIFICANT CHANGE UP (ref 70–99)
GLUCOSE BLDC GLUCOMTR-MCNC: 87 MG/DL — SIGNIFICANT CHANGE UP (ref 70–99)
GLUCOSE BLDC GLUCOMTR-MCNC: 90 MG/DL — SIGNIFICANT CHANGE UP (ref 70–99)

## 2021-06-05 PROCEDURE — 99232 SBSQ HOSP IP/OBS MODERATE 35: CPT

## 2021-06-05 RX ORDER — SODIUM CHLORIDE 9 MG/ML
1000 INJECTION, SOLUTION INTRAVENOUS
Refills: 0 | Status: DISCONTINUED | OUTPATIENT
Start: 2021-06-05 | End: 2021-06-06

## 2021-06-05 RX ADMIN — Medication 1 DROP(S): at 21:36

## 2021-06-05 RX ADMIN — BRIMONIDINE TARTRATE 1 DROP(S): 2 SOLUTION/ DROPS OPHTHALMIC at 21:36

## 2021-06-05 RX ADMIN — Medication 81 MILLIGRAM(S): at 12:28

## 2021-06-05 RX ADMIN — BRIMONIDINE TARTRATE 1 DROP(S): 2 SOLUTION/ DROPS OPHTHALMIC at 12:28

## 2021-06-05 RX ADMIN — Medication 1 DROP(S): at 05:20

## 2021-06-05 RX ADMIN — ATORVASTATIN CALCIUM 80 MILLIGRAM(S): 80 TABLET, FILM COATED ORAL at 21:35

## 2021-06-05 RX ADMIN — LATANOPROST 1 DROP(S): 0.05 SOLUTION/ DROPS OPHTHALMIC; TOPICAL at 21:36

## 2021-06-05 RX ADMIN — HEPARIN SODIUM 5000 UNIT(S): 5000 INJECTION INTRAVENOUS; SUBCUTANEOUS at 19:05

## 2021-06-05 RX ADMIN — BRIMONIDINE TARTRATE 1 DROP(S): 2 SOLUTION/ DROPS OPHTHALMIC at 05:10

## 2021-06-05 RX ADMIN — Medication 1 DROP(S): at 05:11

## 2021-06-05 RX ADMIN — Medication 1 DROP(S): at 19:05

## 2021-06-05 RX ADMIN — CARBIDOPA AND LEVODOPA 2 TABLET(S): 25; 100 TABLET ORAL at 12:27

## 2021-06-05 RX ADMIN — SELEGILINE HYDROCHLORIDE 5 MILLIGRAM(S): 1.25 TABLET, ORALLY DISINTEGRATING ORAL at 19:05

## 2021-06-05 RX ADMIN — TAMSULOSIN HYDROCHLORIDE 0.4 MILLIGRAM(S): 0.4 CAPSULE ORAL at 21:35

## 2021-06-05 RX ADMIN — CARBIDOPA AND LEVODOPA 1.5 TABLET(S): 25; 100 TABLET ORAL at 12:27

## 2021-06-05 RX ADMIN — Medication 1 DROP(S): at 12:28

## 2021-06-05 RX ADMIN — SELEGILINE HYDROCHLORIDE 5 MILLIGRAM(S): 1.25 TABLET, ORALLY DISINTEGRATING ORAL at 05:09

## 2021-06-05 RX ADMIN — ACETAZOLAMIDE 250 MILLIGRAM(S): 250 TABLET ORAL at 21:35

## 2021-06-05 RX ADMIN — SODIUM CHLORIDE 60 MILLILITER(S): 9 INJECTION, SOLUTION INTRAVENOUS at 19:06

## 2021-06-05 RX ADMIN — HEPARIN SODIUM 5000 UNIT(S): 5000 INJECTION INTRAVENOUS; SUBCUTANEOUS at 05:09

## 2021-06-05 RX ADMIN — CARBIDOPA AND LEVODOPA 1.5 TABLET(S): 25; 100 TABLET ORAL at 21:35

## 2021-06-05 NOTE — DIETITIAN INITIAL EVALUATION ADULT. - CONTINUE CURRENT NUTRITION CARE PLAN
- honor pt/pt family wishes regarding nutrition/hydration; aware pt and pts family refusing PEG and understand risk of aspiration with po intake- defer po diet primary team./yes - honor pt/pt family wishes regarding nutrition/hydration; aware pt and pts family refusing PEG and understand risk of aspiration with po intake- defer po diet to primary team./yes

## 2021-06-05 NOTE — PROGRESS NOTE ADULT - ASSESSMENT
75y Male with baseline Parkinson's disease now with new difficulty with right hand.     Possible stroke.   MRI negative for new infarct.     Parkinson's disease   Right hand difficulty likely secondary to progression of Parkinson's disease.  Continue Sinemet and Selegiline.   Appreciate swallow evaluation.   Patient and family declined PEG.    Seen by Palliative care team.     Discharge planning.   He will follow up with his regular neurologist after discharge.     No further specific neurologic recommendations. Will be available as needed.

## 2021-06-05 NOTE — DIETITIAN INITIAL EVALUATION ADULT. - OTHER INFO
75 year old male with a history of HTN, Parkinson's disease, glaucoma, presents with right sided weakness, admitted to r/o CVA. +Dysphagia. Per documentation, hx of progressive dysphagia since 2016. Uncertain of weight changes. Pt basically NPO since admission, briefly received oral diet on 6/1 but was deemed unsafe for po intake. S/p MBS yesterday with recommendations for NPO. Aware seen by GI, pt and family refusing PEG placement and wishes for pt to receive po diet despite known aspiration risk. Palliative following; per documentation, primary team to make decision regarding po status today. RD to follow up.

## 2021-06-05 NOTE — PROGRESS NOTE ADULT - ASSESSMENT
76y/o M PMH Parkinsons dx, HTN, glaucoma, BPH admitted for r/o CVA.     dysphagia, Voice change, R sided weakness likely from Parkinson's dx   CVA ruled out     -CTH negative  -CTA head/neck negative   -MRI: mild periventricular and scattered subcortical white matter ischemia; no acute infarct or hemorrhage  -TTE WNL  -Cont asa, statin   -Neuro following   -Fall risk protocol   -PT eval: home with assist, home PT, RW  -Cont home meds   - S/p MBS 6/2 of which recommendations are for NPO. Esophagram was performed on 5/3 as an outpatient which noted a possible filling defect in the hypopharynx/poss fibromuscular polyp.  Possible that hypopharynx defect is worsening patients baseline dysphagia.   -GI consult appreciated   -ENT recs appreciated  -s/p laryngoscopy 6/3: no pharyngeal polyp or diverticula noted   -d/w pt and pt's family/daughter Danyelle , do not want PEG or NGT at this time   -palliative consult  -swallow eval. D5 LR now     #Hypoglycemia   -Resolved  -Hgba1c 5.5  -Not on DM meds, no glycopenic symptoms, no change with dextrose IV, likely false readings; glucose wnl on repeat bmp   -Cont to monitor   -cont IVF w/ D5 LR FOR NOW  -Accu checks Q6 while NPO    #HTN-Stable  -Continue toprol      #Glaucoma   -Cont eye drops     #BPH   -Cont flomax     Care of plan dw pt  dw daughter over phone, answers all quesions  josiah christian

## 2021-06-05 NOTE — PROGRESS NOTE ADULT - SUBJECTIVE AND OBJECTIVE BOX
Patient is a 75y old  Male who presents with a chief complaint of CVA (05 Jun 2021 11:04)  Pt seen and exam. Pt denies any chest pain,sob. Pt states he is losing his voice gradually he was eating at home.  No cough,fever,chill. Does not want PEG Tube. waiting for swallow eval    REVIEW OF SYSTEMS: All systems are reviewed and found to be negative except above    MEDICATIONS  (STANDING):  acetaZOLAMIDE    Tablet 250 milliGRAM(s) Oral at bedtime  aspirin  chewable 81 milliGRAM(s) Oral daily  atorvastatin 80 milliGRAM(s) Oral at bedtime  bisacodyl Suppository 10 milliGRAM(s) Rectal once  brimonidine 0.2% Ophthalmic Solution 1 Drop(s) Both EYES three times a day  carbidopa/levodopa  25/100 2 Tablet(s) Oral daily  carbidopa/levodopa  25/100 1.5 Tablet(s) Oral <User Schedule>  carbidopa/levodopa  25/100 1.5 Tablet(s) Oral at bedtime  dextrose 5% + lactated ringers. 1000 milliLiter(s) (60 mL/Hr) IV Continuous <Continuous>  heparin   Injectable 5000 Unit(s) SubCutaneous every 12 hours  latanoprost 0.005% Ophthalmic Solution 1 Drop(s) Both EYES at bedtime  metoprolol succinate  milliGRAM(s) Oral daily  pilocarpine 1% Solution 1 Drop(s) Right EYE three times a day  selegiline Oral Tab/Cap 5 milliGRAM(s) Oral two times a day  tamsulosin 0.4 milliGRAM(s) Oral at bedtime  timolol 0.5% Solution 1 Drop(s) Both EYES two times a day    MEDICATIONS  (PRN):      CAPILLARY BLOOD GLUCOSE      POCT Blood Glucose.: 73 mg/dL (05 Jun 2021 12:23)  POCT Blood Glucose.: 90 mg/dL (05 Jun 2021 05:05)  POCT Blood Glucose.: 75 mg/dL (04 Jun 2021 21:57)  POCT Blood Glucose.: 84 mg/dL (04 Jun 2021 17:06)    I&O's Summary    04 Jun 2021 07:01  -  05 Jun 2021 07:00  --------------------------------------------------------  IN: 720 mL / OUT: 0 mL / NET: 720 mL        PHYSICAL EXAM:  Vital Signs Last 24 Hrs  T(C): 37 (05 Jun 2021 08:47), Max: 37 (05 Jun 2021 05:02)  T(F): 98.6 (05 Jun 2021 08:47), Max: 98.6 (05 Jun 2021 05:02)  HR: 52 (05 Jun 2021 08:47) (52 - 66)  BP: 133/75 (05 Jun 2021 08:47) (95/60 - 145/78)  BP(mean): --  RR: 18 (05 Jun 2021 08:47) (18 - 20)  SpO2: 97% (05 Jun 2021 08:47) (94% - 100%)    CONSTITUTIONAL: NAD,  EYES: PERRLA; conjunctiva and sclera clear  ENMT: Moist oral mucosa, very low voice  RESPIRATORY: Normal respiratory effort; lungs are clear to auscultation bilaterally  CARDIOVASCULAR: Regular rate and rhythm, normal S1 and S2, no murmur   EXTS: No lower extremity edema; Peripheral pulses are 2+ bilaterally  ABDOMEN: Nontender to palpation, normoactive bowel sounds, no rebound/guarding;   MUSCLOSKELETAL:   no joint swelling or tenderness to palpation  PSYCH: affect appropriate  NEUROLOGY: A+O to person, place, and time; motor 4/5 RE, 5/5 LE      LABS:                        11.9   5.42  )-----------( 201      ( 04 Jun 2021 06:15 )             36.9     06-04    144  |  113<H>  |  17.2  ----------------------------<  91  3.9   |  22.0  |  1.20    Ca    9.5      04 Jun 2021 06:15  Mg     2.1     06-04                  RADIOLOGY & ADDITIONAL TESTS:  Results Reviewed:   Imaging Personally Reviewed:  Electrocardiogram Personally Reviewed:    COORDINATION OF CARE:  Care Discussed with Consultants/Other Providers [Y/N]:  Prior or Outpatient Records Reviewed [Y/N]:

## 2021-06-05 NOTE — DIETITIAN INITIAL EVALUATION ADULT. - PERTINENT MEDS FT
MEDICATIONS  (STANDING):  acetaZOLAMIDE    Tablet 250 milliGRAM(s) Oral at bedtime  aspirin  chewable 81 milliGRAM(s) Oral daily  atorvastatin 80 milliGRAM(s) Oral at bedtime  bisacodyl Suppository 10 milliGRAM(s) Rectal once  brimonidine 0.2% Ophthalmic Solution 1 Drop(s) Both EYES three times a day  carbidopa/levodopa  25/100 2 Tablet(s) Oral daily  carbidopa/levodopa  25/100 1.5 Tablet(s) Oral <User Schedule>  carbidopa/levodopa  25/100 1.5 Tablet(s) Oral at bedtime  dextrose 5% + lactated ringers. 1000 milliLiter(s) (60 mL/Hr) IV Continuous <Continuous>  heparin   Injectable 5000 Unit(s) SubCutaneous every 12 hours  latanoprost 0.005% Ophthalmic Solution 1 Drop(s) Both EYES at bedtime  metoprolol succinate  milliGRAM(s) Oral daily  pilocarpine 1% Solution 1 Drop(s) Right EYE three times a day  selegiline Oral Tab/Cap 5 milliGRAM(s) Oral two times a day  tamsulosin 0.4 milliGRAM(s) Oral at bedtime  timolol 0.5% Solution 1 Drop(s) Both EYES two times a day    MEDICATIONS  (PRN):

## 2021-06-05 NOTE — DIETITIAN INITIAL EVALUATION ADULT. - PROBLEM/PLAN-3
Marie Batista MD  02/04/21 3593 DISPLAY PLAN FREE TEXT

## 2021-06-05 NOTE — PROGRESS NOTE ADULT - SUBJECTIVE AND OBJECTIVE BOX
[facility]                                        [facility] [place]                                 Cortney Brito, & Carlos Alberto                                  [address]                                        [place], [place], [address]                                             [phone]        CC: Parkinson's disease     HPI:   The patient is a 75y Male with Parkinson's disease who follows with [facility]. He is on Sinemet and Selegiline.   He now presented with difficulty with the right hand.   He was eating and noted that he was having difficulty getting the fork to his mouth.   He ended up hitting his right cheek with the food.    He was brought to the ER and the stroke code was activated. He was evaluated by the tele-stroke service.   He was felt not to be a t-PA candidate.    Interim history:  Remains on 3 [place].     ROS:   Denies headache or dizziness.  Denies chest pain.  Denies shortness of breath.    MEDICATIONS  (STANDING):  acetaZOLAMIDE    Tablet 250 milliGRAM(s) Oral at bedtime  aspirin  chewable 81 milliGRAM(s) Oral daily  atorvastatin 80 milliGRAM(s) Oral at bedtime  bisacodyl Suppository 10 milliGRAM(s) Rectal once  brimonidine 0.2% Ophthalmic Solution 1 Drop(s) Both EYES three times a day  carbidopa/levodopa  25/100 2 Tablet(s) Oral daily  carbidopa/levodopa  25/100 1.5 Tablet(s) Oral <User Schedule>  carbidopa/levodopa  25/100 1.5 Tablet(s) Oral at bedtime  dextrose 5% + lactated ringers. 1000 milliLiter(s) (60 mL/Hr) IV Continuous <Continuous>  heparin   Injectable 5000 Unit(s) SubCutaneous every 12 hours  latanoprost 0.005% Ophthalmic Solution 1 Drop(s) Both EYES at bedtime  metoprolol succinate  milliGRAM(s) Oral daily  pilocarpine 1% Solution 1 Drop(s) Right EYE three times a day  selegiline Oral Tab/Cap 5 milliGRAM(s) Oral two times a day  tamsulosin 0.4 milliGRAM(s) Oral at bedtime  timolol 0.5% Solution 1 Drop(s) Both EYES two times a day      Vital Signs Last 24 Hrs  T(C): 37 (05 Jun 2021 08:47), Max: 37 (05 Jun 2021 05:02)  T(F): 98.6 (05 Jun 2021 08:47), Max: 98.6 (05 Jun 2021 05:02)  HR: 52 (05 Jun 2021 08:47) (52 - 66)  BP: 133/75 (05 Jun 2021 08:47) (95/60 - 145/78)  BP(mean): --  RR: 18 (05 Jun 2021 08:47) (18 - 20)  SpO2: 97% (05 Jun 2021 08:47) (94% - 100%)    Detailed Neurologic Exam:    Cranial nerves: Pupils react symmetrically to light. There is no visual field deficit to confrontation. Extraocular motion is full with no nystagmus.  Facial sensation is intact. Facial musculature is symmetric. There is masking of facial expression. Palate elevates symmetrically. Tongue is midline.    Motor: There is some increased tone with cogwheeling at the wrists and elbows with reinforcement.   mild diffuse weakness    Sensation: Intact to light touch and pin. There is no extinction to double simultaneous stimulation.    Reflexes: 1+ throughout and plantar responses are flexor.    Cerebellar: There is no dysmetria finger to nose testing.    Labs:     06-04    144  |  113<H>  |  17.2  ----------------------------<  91  3.9   |  22.0  |  1.20    Ca    9.5      04 Jun 2021 06:15  Mg     2.1     06-04                              11.9   5.42  )-----------( 201      ( 04 Jun 2021 06:15 )             36.9       Rad:   MRI brain images reviewed (and concur with report): There is no acute pathology.

## 2021-06-05 NOTE — DIETITIAN INITIAL EVALUATION ADULT. - ETIOLOGY
related to inability to meet sufficient protein-energy in setting of Parkinson's disease and progressive dysphagia

## 2021-06-05 NOTE — DIETITIAN NUTRITION RISK NOTIFICATION - ADDITIONAL COMMENTS/DIETITIAN RECOMMENDATIONS
Honor pt/pt family wishes regarding nutrition/hydration; aware pt and pts family refusing PEG and understand risk of aspiration with po intake- defer po diet to primary team.  Rx: MVI as feasible.   Obtain daily weights to monitor trends.

## 2021-06-06 LAB
ANION GAP SERPL CALC-SCNC: 10 MMOL/L — SIGNIFICANT CHANGE UP (ref 5–17)
BASOPHILS # BLD AUTO: 0.02 K/UL — SIGNIFICANT CHANGE UP (ref 0–0.2)
BASOPHILS NFR BLD AUTO: 0.4 % — SIGNIFICANT CHANGE UP (ref 0–2)
BUN SERPL-MCNC: 14.1 MG/DL — SIGNIFICANT CHANGE UP (ref 8–20)
CALCIUM SERPL-MCNC: 9.3 MG/DL — SIGNIFICANT CHANGE UP (ref 8.6–10.2)
CHLORIDE SERPL-SCNC: 116 MMOL/L — HIGH (ref 98–107)
CO2 SERPL-SCNC: 20 MMOL/L — LOW (ref 22–29)
CREAT SERPL-MCNC: 0.98 MG/DL — SIGNIFICANT CHANGE UP (ref 0.5–1.3)
EOSINOPHIL # BLD AUTO: 0.06 K/UL — SIGNIFICANT CHANGE UP (ref 0–0.5)
EOSINOPHIL NFR BLD AUTO: 1.1 % — SIGNIFICANT CHANGE UP (ref 0–6)
GLUCOSE BLDC GLUCOMTR-MCNC: 129 MG/DL — HIGH (ref 70–99)
GLUCOSE BLDC GLUCOMTR-MCNC: 60 MG/DL — LOW (ref 70–99)
GLUCOSE BLDC GLUCOMTR-MCNC: 61 MG/DL — LOW (ref 70–99)
GLUCOSE BLDC GLUCOMTR-MCNC: 76 MG/DL — SIGNIFICANT CHANGE UP (ref 70–99)
GLUCOSE BLDC GLUCOMTR-MCNC: 80 MG/DL — SIGNIFICANT CHANGE UP (ref 70–99)
GLUCOSE BLDC GLUCOMTR-MCNC: 93 MG/DL — SIGNIFICANT CHANGE UP (ref 70–99)
GLUCOSE SERPL-MCNC: 94 MG/DL — SIGNIFICANT CHANGE UP (ref 70–99)
HCT VFR BLD CALC: 37.8 % — LOW (ref 39–50)
HGB BLD-MCNC: 12.1 G/DL — LOW (ref 13–17)
IMM GRANULOCYTES NFR BLD AUTO: 0.4 % — SIGNIFICANT CHANGE UP (ref 0–1.5)
LYMPHOCYTES # BLD AUTO: 1.79 K/UL — SIGNIFICANT CHANGE UP (ref 1–3.3)
LYMPHOCYTES # BLD AUTO: 32.2 % — SIGNIFICANT CHANGE UP (ref 13–44)
MCHC RBC-ENTMCNC: 29.7 PG — SIGNIFICANT CHANGE UP (ref 27–34)
MCHC RBC-ENTMCNC: 32 GM/DL — SIGNIFICANT CHANGE UP (ref 32–36)
MCV RBC AUTO: 92.9 FL — SIGNIFICANT CHANGE UP (ref 80–100)
MONOCYTES # BLD AUTO: 0.5 K/UL — SIGNIFICANT CHANGE UP (ref 0–0.9)
MONOCYTES NFR BLD AUTO: 9 % — SIGNIFICANT CHANGE UP (ref 2–14)
NEUTROPHILS # BLD AUTO: 3.17 K/UL — SIGNIFICANT CHANGE UP (ref 1.8–7.4)
NEUTROPHILS NFR BLD AUTO: 56.9 % — SIGNIFICANT CHANGE UP (ref 43–77)
PLATELET # BLD AUTO: 203 K/UL — SIGNIFICANT CHANGE UP (ref 150–400)
POTASSIUM SERPL-MCNC: 3.7 MMOL/L — SIGNIFICANT CHANGE UP (ref 3.5–5.3)
POTASSIUM SERPL-SCNC: 3.7 MMOL/L — SIGNIFICANT CHANGE UP (ref 3.5–5.3)
RBC # BLD: 4.07 M/UL — LOW (ref 4.2–5.8)
RBC # FLD: 13.7 % — SIGNIFICANT CHANGE UP (ref 10.3–14.5)
SODIUM SERPL-SCNC: 146 MMOL/L — HIGH (ref 135–145)
WBC # BLD: 5.56 K/UL — SIGNIFICANT CHANGE UP (ref 3.8–10.5)
WBC # FLD AUTO: 5.56 K/UL — SIGNIFICANT CHANGE UP (ref 3.8–10.5)

## 2021-06-06 PROCEDURE — 99497 ADVNCD CARE PLAN 30 MIN: CPT

## 2021-06-06 PROCEDURE — 99232 SBSQ HOSP IP/OBS MODERATE 35: CPT

## 2021-06-06 RX ORDER — DEXTROSE 50 % IN WATER 50 %
12.5 SYRINGE (ML) INTRAVENOUS ONCE
Refills: 0 | Status: COMPLETED | OUTPATIENT
Start: 2021-06-06 | End: 2021-06-06

## 2021-06-06 RX ORDER — SODIUM CHLORIDE 9 MG/ML
1000 INJECTION, SOLUTION INTRAVENOUS
Refills: 0 | Status: DISCONTINUED | OUTPATIENT
Start: 2021-06-06 | End: 2021-06-07

## 2021-06-06 RX ADMIN — HEPARIN SODIUM 5000 UNIT(S): 5000 INJECTION INTRAVENOUS; SUBCUTANEOUS at 17:45

## 2021-06-06 RX ADMIN — Medication 12.5 GRAM(S): at 12:10

## 2021-06-06 RX ADMIN — ATORVASTATIN CALCIUM 80 MILLIGRAM(S): 80 TABLET, FILM COATED ORAL at 21:31

## 2021-06-06 RX ADMIN — CARBIDOPA AND LEVODOPA 1.5 TABLET(S): 25; 100 TABLET ORAL at 21:31

## 2021-06-06 RX ADMIN — SODIUM CHLORIDE 60 MILLILITER(S): 9 INJECTION, SOLUTION INTRAVENOUS at 11:46

## 2021-06-06 RX ADMIN — SELEGILINE HYDROCHLORIDE 5 MILLIGRAM(S): 1.25 TABLET, ORALLY DISINTEGRATING ORAL at 05:59

## 2021-06-06 RX ADMIN — Medication 1 DROP(S): at 11:48

## 2021-06-06 RX ADMIN — BRIMONIDINE TARTRATE 1 DROP(S): 2 SOLUTION/ DROPS OPHTHALMIC at 21:31

## 2021-06-06 RX ADMIN — LATANOPROST 1 DROP(S): 0.05 SOLUTION/ DROPS OPHTHALMIC; TOPICAL at 21:31

## 2021-06-06 RX ADMIN — CARBIDOPA AND LEVODOPA 2 TABLET(S): 25; 100 TABLET ORAL at 11:47

## 2021-06-06 RX ADMIN — Medication 1 DROP(S): at 21:31

## 2021-06-06 RX ADMIN — Medication 1 DROP(S): at 05:59

## 2021-06-06 RX ADMIN — SODIUM CHLORIDE 80 MILLILITER(S): 9 INJECTION, SOLUTION INTRAVENOUS at 17:45

## 2021-06-06 RX ADMIN — BRIMONIDINE TARTRATE 1 DROP(S): 2 SOLUTION/ DROPS OPHTHALMIC at 11:47

## 2021-06-06 RX ADMIN — BRIMONIDINE TARTRATE 1 DROP(S): 2 SOLUTION/ DROPS OPHTHALMIC at 05:59

## 2021-06-06 RX ADMIN — Medication 81 MILLIGRAM(S): at 11:47

## 2021-06-06 RX ADMIN — TAMSULOSIN HYDROCHLORIDE 0.4 MILLIGRAM(S): 0.4 CAPSULE ORAL at 21:31

## 2021-06-06 RX ADMIN — HEPARIN SODIUM 5000 UNIT(S): 5000 INJECTION INTRAVENOUS; SUBCUTANEOUS at 05:59

## 2021-06-06 RX ADMIN — CARBIDOPA AND LEVODOPA 1.5 TABLET(S): 25; 100 TABLET ORAL at 11:47

## 2021-06-06 RX ADMIN — ACETAZOLAMIDE 250 MILLIGRAM(S): 250 TABLET ORAL at 21:31

## 2021-06-06 RX ADMIN — SELEGILINE HYDROCHLORIDE 5 MILLIGRAM(S): 1.25 TABLET, ORALLY DISINTEGRATING ORAL at 17:45

## 2021-06-06 RX ADMIN — Medication 1 DROP(S): at 06:00

## 2021-06-06 RX ADMIN — Medication 1 DROP(S): at 17:45

## 2021-06-06 NOTE — PROGRESS NOTE ADULT - ASSESSMENT
76y/o M PMH Parkinsons dx, HTN, glaucoma, BPH admitted for r/o CVA.     Dysphagia, Voice change, R sided weakness likely from Parkinson's dx   CVA ruled out     -CTH negative  -CTA head/neck negative   -MRI: mild periventricular and scattered subcortical white matter ischemia; no acute infarct or hemorrhage  -TTE WNL  -Cont asa, statin   -Neuro following   -Fall risk protocol   -PT eval: home with assist, home PT, RW  -Cont home meds   - S/p MBS 6/2 of which recommendations are for NPO. Esophagram was performed on 5/3 as an outpatient which noted a possible filling defect in the hypopharynx/poss fibromuscular polyp.  Possible that hypopharynx defect is worsening patients baseline dysphagia.   -GI consult appreciated   -ENT recs appreciated  -s/p laryngoscopy 6/3: no pharyngeal polyp or diverticula noted   -d/w pt and pt's Wife /daughter Danyelle , do not want PEG or NGT at this time   -Long dw pts cousin Dr Delong [phone] w/pts permission  -Pt is refusing PEG. wants to eat,understood risk of aspiration,pna,respiratory failure,high risk of intubation, high mortality.  Pt wants to eat by understanding risk. He wants to be full code including intubation, resuscitation. Pts family understood risk and agreed with pts decision.  - will start pureed w/necter thick for now with assist feed. Aspiration precaution. Gentle hydration.bmp   -palliative consult      #Hypoglycemia   -Resume diet, gentle hydration,hypoglycemia protocol.accucheck  -Hgba1c 5.5  -Not on DM meds, no glycopenic symptoms, no change with dextrose IV, likely false readings; glucose wnl on repeat bmp   -Cont to monitor     #HTN-Stable  -Continue toprol      #Glaucoma   -Cont eye drops     #BPH   -Cont flomax   Pt seen by PT Rec home PT,Ambulated with walker with supervision    Care of plan dw pt  dw daughter over phone, answers all quesions. per daughter he is currently living at basement w/wife,waiting for housing.   c/s  for safe disposition  josiah christian

## 2021-06-06 NOTE — PROGRESS NOTE ADULT - SUBJECTIVE AND OBJECTIVE BOX
Patient is a 75y old  Male who presents with a chief complaint of CVA (05 Jun 2021 12:25)  Pt denies sob, cp,palpitation,fever,chill  Pt ambulates with PT    REVIEW OF SYSTEMS: All systems are reviewed and found to be negative except above    MEDICATIONS  (STANDING):  acetaZOLAMIDE    Tablet 250 milliGRAM(s) Oral at bedtime  aspirin  chewable 81 milliGRAM(s) Oral daily  atorvastatin 80 milliGRAM(s) Oral at bedtime  bisacodyl Suppository 10 milliGRAM(s) Rectal once  brimonidine 0.2% Ophthalmic Solution 1 Drop(s) Both EYES three times a day  carbidopa/levodopa  25/100 2 Tablet(s) Oral daily  carbidopa/levodopa  25/100 1.5 Tablet(s) Oral <User Schedule>  carbidopa/levodopa  25/100 1.5 Tablet(s) Oral at bedtime  dextrose 5%. 1000 milliLiter(s) (80 mL/Hr) IV Continuous <Continuous>  heparin   Injectable 5000 Unit(s) SubCutaneous every 12 hours  latanoprost 0.005% Ophthalmic Solution 1 Drop(s) Both EYES at bedtime  metoprolol succinate  milliGRAM(s) Oral daily  pilocarpine 1% Solution 1 Drop(s) Right EYE three times a day  selegiline Oral Tab/Cap 5 milliGRAM(s) Oral two times a day  tamsulosin 0.4 milliGRAM(s) Oral at bedtime  timolol 0.5% Solution 1 Drop(s) Both EYES two times a day    MEDICATIONS  (PRN):      CAPILLARY BLOOD GLUCOSE      POCT Blood Glucose.: 129 mg/dL (06 Jun 2021 13:05)  POCT Blood Glucose.: 60 mg/dL (06 Jun 2021 11:55)  POCT Blood Glucose.: 61 mg/dL (06 Jun 2021 11:54)  POCT Blood Glucose.: 80 mg/dL (06 Jun 2021 06:15)  POCT Blood Glucose.: 87 mg/dL (05 Jun 2021 21:34)  POCT Blood Glucose.: 130 mg/dL (05 Jun 2021 18:13)    I&O's Summary    05 Jun 2021 07:01  -  06 Jun 2021 07:00  --------------------------------------------------------  IN: 1440 mL / OUT: 600 mL / NET: 840 mL    06 Jun 2021 07:01  -  06 Jun 2021 13:32  --------------------------------------------------------  IN: 300 mL / OUT: 800 mL / NET: -500 mL        PHYSICAL EXAM:  Vital Signs Last 24 Hrs  T(C): 36.7 (06 Jun 2021 07:32), Max: 36.7 (06 Jun 2021 05:57)  T(F): 98.1 (06 Jun 2021 07:32), Max: 98.1 (06 Jun 2021 07:32)  HR: 51 (06 Jun 2021 07:32) (50 - 64)  BP: 131/74 (06 Jun 2021 07:32) (114/71 - 147/78)  BP(mean): --  RR: 18 (06 Jun 2021 07:32) (16 - 18)  SpO2: 95% (06 Jun 2021 07:32) (95% - 100%)    CONSTITUTIONAL: NAD,  EYES: PERRLA; conjunctiva and sclera clear  ENMT: Moist oral mucosa,   RESPIRATORY: Normal respiratory effort; lungs are clear to auscultation bilaterally  CARDIOVASCULAR: Regular rate and rhythm, normal S1 and S2, no murmur   EXTS: No lower extremity edema; Peripheral pulses are 2+ bilaterally  ABDOMEN: Nontender to palpation, normoactive bowel sounds, no rebound/guarding;   MUSCLOSKELETAL:    no joint swelling or tenderness to palpation  PSYCH: affect appropriate  NEUROLOGY: A+O to person, place, and time; +M/S      LABS:                        12.1   5.56  )-----------( 203      ( 06 Jun 2021 06:11 )             37.8     06-06    146<H>  |  116<H>  |  14.1  ----------------------------<  94  3.7   |  20.0<L>  |  0.98    Ca    9.3      06 Jun 2021 06:11                  RADIOLOGY & ADDITIONAL TESTS:  Results Reviewed:   Imaging Personally Reviewed:  Electrocardiogram Personally Reviewed:    COORDINATION OF CARE:  Care Discussed with Consultants/Other Providers [Y/N]:  Prior or Outpatient Records Reviewed [Y/N]:

## 2021-06-06 NOTE — PROGRESS NOTE ADULT - CONVERSATION DETAILS
76y/o M PMH Parkinsons dx, HTN, glaucoma, BPH,dysphagia failed MBS. seen by GI, ENT. Pt is refusing PEG. wants to eat,understood risk of aspiration,pna,respiratory failure,high risk of intubation,high mortality.  Pt wants to eat by understanding risk. He wants to be full code including intubation, resuscitation.

## 2021-06-07 ENCOUNTER — TRANSCRIPTION ENCOUNTER (OUTPATIENT)
Age: 76
End: 2021-06-07

## 2021-06-07 VITALS
TEMPERATURE: 98 F | SYSTOLIC BLOOD PRESSURE: 105 MMHG | OXYGEN SATURATION: 99 % | HEART RATE: 71 BPM | RESPIRATION RATE: 18 BRPM | DIASTOLIC BLOOD PRESSURE: 67 MMHG

## 2021-06-07 LAB
ANION GAP SERPL CALC-SCNC: 12 MMOL/L — SIGNIFICANT CHANGE UP (ref 5–17)
BUN SERPL-MCNC: 12.2 MG/DL — SIGNIFICANT CHANGE UP (ref 8–20)
CALCIUM SERPL-MCNC: 9 MG/DL — SIGNIFICANT CHANGE UP (ref 8.6–10.2)
CHLORIDE SERPL-SCNC: 113 MMOL/L — HIGH (ref 98–107)
CO2 SERPL-SCNC: 21 MMOL/L — LOW (ref 22–29)
CREAT SERPL-MCNC: 0.99 MG/DL — SIGNIFICANT CHANGE UP (ref 0.5–1.3)
GLUCOSE BLDC GLUCOMTR-MCNC: 144 MG/DL — HIGH (ref 70–99)
GLUCOSE BLDC GLUCOMTR-MCNC: 65 MG/DL — LOW (ref 70–99)
GLUCOSE BLDC GLUCOMTR-MCNC: 67 MG/DL — LOW (ref 70–99)
GLUCOSE BLDC GLUCOMTR-MCNC: 81 MG/DL — SIGNIFICANT CHANGE UP (ref 70–99)
GLUCOSE SERPL-MCNC: 97 MG/DL — SIGNIFICANT CHANGE UP (ref 70–99)
HCT VFR BLD CALC: 35.9 % — LOW (ref 39–50)
HGB BLD-MCNC: 11.8 G/DL — LOW (ref 13–17)
MCHC RBC-ENTMCNC: 29.9 PG — SIGNIFICANT CHANGE UP (ref 27–34)
MCHC RBC-ENTMCNC: 32.9 GM/DL — SIGNIFICANT CHANGE UP (ref 32–36)
MCV RBC AUTO: 90.9 FL — SIGNIFICANT CHANGE UP (ref 80–100)
PLATELET # BLD AUTO: 188 K/UL — SIGNIFICANT CHANGE UP (ref 150–400)
POTASSIUM SERPL-MCNC: 3.3 MMOL/L — LOW (ref 3.5–5.3)
POTASSIUM SERPL-SCNC: 3.3 MMOL/L — LOW (ref 3.5–5.3)
RBC # BLD: 3.95 M/UL — LOW (ref 4.2–5.8)
RBC # FLD: 13.5 % — SIGNIFICANT CHANGE UP (ref 10.3–14.5)
SARS-COV-2 RNA SPEC QL NAA+PROBE: SIGNIFICANT CHANGE UP
SODIUM SERPL-SCNC: 146 MMOL/L — HIGH (ref 135–145)
WBC # BLD: 5.5 K/UL — SIGNIFICANT CHANGE UP (ref 3.8–10.5)
WBC # FLD AUTO: 5.5 K/UL — SIGNIFICANT CHANGE UP (ref 3.8–10.5)

## 2021-06-07 PROCEDURE — 82803 BLOOD GASES ANY COMBINATION: CPT

## 2021-06-07 PROCEDURE — 70450 CT HEAD/BRAIN W/O DYE: CPT

## 2021-06-07 PROCEDURE — 92526 ORAL FUNCTION THERAPY: CPT

## 2021-06-07 PROCEDURE — 83036 HEMOGLOBIN GLYCOSYLATED A1C: CPT

## 2021-06-07 PROCEDURE — 97530 THERAPEUTIC ACTIVITIES: CPT

## 2021-06-07 PROCEDURE — 70498 CT ANGIOGRAPHY NECK: CPT

## 2021-06-07 PROCEDURE — 85610 PROTHROMBIN TIME: CPT

## 2021-06-07 PROCEDURE — 80053 COMPREHEN METABOLIC PANEL: CPT

## 2021-06-07 PROCEDURE — 70551 MRI BRAIN STEM W/O DYE: CPT

## 2021-06-07 PROCEDURE — U0005: CPT

## 2021-06-07 PROCEDURE — 83735 ASSAY OF MAGNESIUM: CPT

## 2021-06-07 PROCEDURE — 86803 HEPATITIS C AB TEST: CPT

## 2021-06-07 PROCEDURE — 97167 OT EVAL HIGH COMPLEX 60 MIN: CPT

## 2021-06-07 PROCEDURE — 97116 GAIT TRAINING THERAPY: CPT

## 2021-06-07 PROCEDURE — 99239 HOSP IP/OBS DSCHRG MGMT >30: CPT

## 2021-06-07 PROCEDURE — 71045 X-RAY EXAM CHEST 1 VIEW: CPT

## 2021-06-07 PROCEDURE — 97535 SELF CARE MNGMENT TRAINING: CPT

## 2021-06-07 PROCEDURE — 80048 BASIC METABOLIC PNL TOTAL CA: CPT

## 2021-06-07 PROCEDURE — 74230 X-RAY XM SWLNG FUNCJ C+: CPT | Mod: 26

## 2021-06-07 PROCEDURE — 85025 COMPLETE CBC W/AUTO DIFF WBC: CPT

## 2021-06-07 PROCEDURE — 0042T: CPT

## 2021-06-07 PROCEDURE — 74230 X-RAY XM SWLNG FUNCJ C+: CPT

## 2021-06-07 PROCEDURE — 82962 GLUCOSE BLOOD TEST: CPT

## 2021-06-07 PROCEDURE — 93306 TTE W/DOPPLER COMPLETE: CPT

## 2021-06-07 PROCEDURE — 99223 1ST HOSP IP/OBS HIGH 75: CPT

## 2021-06-07 PROCEDURE — 93005 ELECTROCARDIOGRAM TRACING: CPT

## 2021-06-07 PROCEDURE — 36415 COLL VENOUS BLD VENIPUNCTURE: CPT

## 2021-06-07 PROCEDURE — U0003: CPT

## 2021-06-07 PROCEDURE — 99291 CRITICAL CARE FIRST HOUR: CPT | Mod: 25

## 2021-06-07 PROCEDURE — 84484 ASSAY OF TROPONIN QUANT: CPT

## 2021-06-07 PROCEDURE — 85027 COMPLETE CBC AUTOMATED: CPT

## 2021-06-07 PROCEDURE — 85730 THROMBOPLASTIN TIME PARTIAL: CPT

## 2021-06-07 PROCEDURE — 80061 LIPID PANEL: CPT

## 2021-06-07 PROCEDURE — 70496 CT ANGIOGRAPHY HEAD: CPT

## 2021-06-07 PROCEDURE — 86769 SARS-COV-2 COVID-19 ANTIBODY: CPT

## 2021-06-07 RX ORDER — DEXTROSE 50 % IN WATER 50 %
12.5 SYRINGE (ML) INTRAVENOUS ONCE
Refills: 0 | Status: COMPLETED | OUTPATIENT
Start: 2021-06-07 | End: 2021-06-07

## 2021-06-07 RX ORDER — HEPARIN SODIUM 5000 [USP'U]/ML
5000 INJECTION INTRAVENOUS; SUBCUTANEOUS
Qty: 0 | Refills: 0 | DISCHARGE
Start: 2021-06-07

## 2021-06-07 RX ORDER — POTASSIUM CHLORIDE 20 MEQ
10 PACKET (EA) ORAL
Refills: 0 | Status: COMPLETED | OUTPATIENT
Start: 2021-06-07 | End: 2021-06-07

## 2021-06-07 RX ADMIN — BRIMONIDINE TARTRATE 1 DROP(S): 2 SOLUTION/ DROPS OPHTHALMIC at 05:02

## 2021-06-07 RX ADMIN — SODIUM CHLORIDE 100 MILLILITER(S): 9 INJECTION, SOLUTION INTRAVENOUS at 11:42

## 2021-06-07 RX ADMIN — Medication 1 DROP(S): at 05:03

## 2021-06-07 RX ADMIN — Medication 100 MILLIEQUIVALENT(S): at 11:44

## 2021-06-07 RX ADMIN — Medication 100 MILLIEQUIVALENT(S): at 12:36

## 2021-06-07 RX ADMIN — BRIMONIDINE TARTRATE 1 DROP(S): 2 SOLUTION/ DROPS OPHTHALMIC at 11:42

## 2021-06-07 RX ADMIN — SELEGILINE HYDROCHLORIDE 5 MILLIGRAM(S): 1.25 TABLET, ORALLY DISINTEGRATING ORAL at 05:02

## 2021-06-07 RX ADMIN — CARBIDOPA AND LEVODOPA 1.5 TABLET(S): 25; 100 TABLET ORAL at 11:46

## 2021-06-07 RX ADMIN — SODIUM CHLORIDE 100 MILLILITER(S): 9 INJECTION, SOLUTION INTRAVENOUS at 09:58

## 2021-06-07 RX ADMIN — Medication 12.5 GRAM(S): at 12:15

## 2021-06-07 RX ADMIN — HEPARIN SODIUM 5000 UNIT(S): 5000 INJECTION INTRAVENOUS; SUBCUTANEOUS at 17:36

## 2021-06-07 RX ADMIN — HEPARIN SODIUM 5000 UNIT(S): 5000 INJECTION INTRAVENOUS; SUBCUTANEOUS at 05:02

## 2021-06-07 RX ADMIN — Medication 100 MILLIEQUIVALENT(S): at 09:58

## 2021-06-07 RX ADMIN — Medication 1 DROP(S): at 17:36

## 2021-06-07 RX ADMIN — CARBIDOPA AND LEVODOPA 2 TABLET(S): 25; 100 TABLET ORAL at 11:45

## 2021-06-07 RX ADMIN — Medication 81 MILLIGRAM(S): at 11:45

## 2021-06-07 RX ADMIN — Medication 1 DROP(S): at 11:43

## 2021-06-07 RX ADMIN — SELEGILINE HYDROCHLORIDE 5 MILLIGRAM(S): 1.25 TABLET, ORALLY DISINTEGRATING ORAL at 17:36

## 2021-06-07 NOTE — DISCHARGE NOTE PROVIDER - DETAILS OF MALNUTRITION DIAGNOSIS/DIAGNOSES
This patient has been assessed with a concern for Malnutrition and was treated during this hospitalization for the following Nutrition diagnosis/diagnoses:     -  06/05/2021: Severe protein-calorie malnutrition

## 2021-06-07 NOTE — SWALLOW VFSS/MBS ASSESSMENT ADULT - ORAL PHASE
within functional limits/Uncontrolled bolus / spillover in julia-pharynx within functional limits/Uncontrolled bolus / spillover in hypopharynx Within functional limits/Uncontrolled bolus / spillover in julia-pharynx

## 2021-06-07 NOTE — DISCHARGE NOTE PROVIDER - NSDCFUADDAPPT_GEN_ALL_CORE_FT
Pt's meds will be managed at SNF.          ***Pt. has  a neurology appointment  with Dr Rufus Koch  on  6/12 at  9:20am at Address: [address] [place] [address] .   We advise that you do not miss this f/u appointment .   If you have to change the date or time please call   Phone: [phone]

## 2021-06-07 NOTE — PROGRESS NOTE ADULT - NSICDXPILOT_GEN_ALL_CORE
[place]

## 2021-06-07 NOTE — CHART NOTE - NSCHARTNOTEFT_GEN_A_CORE
Palliative care social work note.    SW contacted patients wife Berta to offer support in coping with patients hospitalization and medical  issues. Wife acknowledges being overwhelmed at times but is appreciative of the support her 2 daughters and son provide. patients wife Berta reports 1 daughter is a SW and other NP who helps coordinate all the medical issues for her.

## 2021-06-07 NOTE — PROGRESS NOTE ADULT - SUBJECTIVE AND OBJECTIVE BOX
Patient is a 75y old  Male who presents with a chief complaint of CVA (06 Jun 2021 13:32)    No acute issues. Pt states he is feeling more strong. speech mild improvement.  Denies cp, sob, weakness ,numbness.    REVIEW OF SYSTEMS: All systems are reviewed and found to be negative except above    MEDICATIONS  (STANDING):  acetaZOLAMIDE    Tablet 250 milliGRAM(s) Oral at bedtime  aspirin  chewable 81 milliGRAM(s) Oral daily  atorvastatin 80 milliGRAM(s) Oral at bedtime  bisacodyl Suppository 10 milliGRAM(s) Rectal once  brimonidine 0.2% Ophthalmic Solution 1 Drop(s) Both EYES three times a day  carbidopa/levodopa  25/100 2 Tablet(s) Oral daily  carbidopa/levodopa  25/100 1.5 Tablet(s) Oral <User Schedule>  carbidopa/levodopa  25/100 1.5 Tablet(s) Oral at bedtime  dextrose 5%. 1000 milliLiter(s) (100 mL/Hr) IV Continuous <Continuous>  heparin   Injectable 5000 Unit(s) SubCutaneous every 12 hours  latanoprost 0.005% Ophthalmic Solution 1 Drop(s) Both EYES at bedtime  metoprolol succinate  milliGRAM(s) Oral daily  pilocarpine 1% Solution 1 Drop(s) Right EYE three times a day  potassium chloride  10 mEq/100 mL IVPB 10 milliEquivalent(s) IV Intermittent every 1 hour  selegiline Oral Tab/Cap 5 milliGRAM(s) Oral two times a day  tamsulosin 0.4 milliGRAM(s) Oral at bedtime  timolol 0.5% Solution 1 Drop(s) Both EYES two times a day    MEDICATIONS  (PRN):      CAPILLARY BLOOD GLUCOSE      POCT Blood Glucose.: 81 mg/dL (07 Jun 2021 04:58)  POCT Blood Glucose.: 76 mg/dL (06 Jun 2021 23:34)  POCT Blood Glucose.: 93 mg/dL (06 Jun 2021 17:43)  POCT Blood Glucose.: 129 mg/dL (06 Jun 2021 13:05)  POCT Blood Glucose.: 60 mg/dL (06 Jun 2021 11:55)  POCT Blood Glucose.: 61 mg/dL (06 Jun 2021 11:54)    I&O's Summary    06 Jun 2021 07:01  -  07 Jun 2021 07:00  --------------------------------------------------------  IN: 760 mL / OUT: 800 mL / NET: -40 mL    07 Jun 2021 07:01  -  07 Jun 2021 10:11  --------------------------------------------------------  IN: 0 mL / OUT: 750 mL / NET: -750 mL        PHYSICAL EXAM:  Vital Signs Last 24 Hrs  T(C): 37.3 (07 Jun 2021 09:09), Max: 37.3 (07 Jun 2021 09:09)  T(F): 99.1 (07 Jun 2021 09:09), Max: 99.1 (07 Jun 2021 09:09)  HR: 59 (07 Jun 2021 09:09) (56 - 63)  BP: 144/86 (07 Jun 2021 09:09) (99/63 - 161/88)  BP(mean): --  RR: 18 (07 Jun 2021 09:09) (16 - 18)  SpO2: 94% (07 Jun 2021 09:09) (94% - 100%)    CONSTITUTIONAL: NAD,  EYES: PERRLA; conjunctiva and sclera clear  ENMT: Moist oral mucosa,   RESPIRATORY: Normal respiratory effort; lungs are clear to auscultation bilaterally  CARDIOVASCULAR: Regular rate and rhythm, normal S1 and S2, no murmur   EXTS: No lower extremity edema; Peripheral pulses are 2+ bilaterally  ABDOMEN: Nontender to palpation, normoactive bowel sounds, no rebound/guarding;   MUSCLOSKELETAL:  ; no joint swelling or tenderness to palpation  PSYCH: affect appropriate  NEUROLOGY: A+O to person, place, and time; +m/s. slow speech      LABS:                        11.8   5.50  )-----------( 188      ( 07 Jun 2021 06:50 )             35.9     06-07    146<H>  |  113<H>  |  12.2  ----------------------------<  97  3.3<L>   |  21.0<L>  |  0.99    Ca    9.0      07 Jun 2021 06:50                  RADIOLOGY & ADDITIONAL TESTS:  Results Reviewed:   Imaging Personally Reviewed:  Electrocardiogram Personally Reviewed:    COORDINATION OF CARE:  Care Discussed with Consultants/Other Providers [Y/N]:  Prior or Outpatient Records Reviewed [Y/N]:

## 2021-06-07 NOTE — CONSULT NOTE ADULT - CONSULT REASON
Dysphagia
Debility
oropharyngeal dysphagia and abnormal esophagram
goals of care
Stroke and Parkinson's disease

## 2021-06-07 NOTE — PROGRESS NOTE ADULT - NUTRITIONAL ASSESSMENT
This patient has been assessed with a concern for Malnutrition and has been determined to have a diagnosis/diagnoses of Severe protein-calorie malnutrition.    This patient is being managed with:   Diet NPO-  Except Medications  Entered: Jun 6 2021  6:55PM    
This patient has been assessed with a concern for Malnutrition and has been determined to have a diagnosis/diagnoses of Severe protein-calorie malnutrition.    This patient is being managed with:   Diet NPO-  Except Medications  Entered: Jun 2 2021  3:03PM    
This patient has been assessed with a concern for Malnutrition and has been determined to have a diagnosis/diagnoses of Severe protein-calorie malnutrition.    This patient is being managed with:   Diet NPO-  Except Medications  Entered: Jun 2 2021  3:03PM

## 2021-06-07 NOTE — SWALLOW VFSS/MBS ASSESSMENT ADULT - ADDITIONAL RECOMMENDATIONS
Cues for subsequent swallow to facilitate clearance of pharyngeal residue   SLP to follow for diet tolerance monitoring

## 2021-06-07 NOTE — SWALLOW VFSS/MBS ASSESSMENT ADULT - SLP PERTINENT HISTORY OF CURRENT PROBLEM
As per MD note, ""74 y/o male with hx of glaucoma, HTN, Parkinsons who resides at home with his wife. He uses a cane at baseline, and admits to being on dysphagia diet. Patient noted right hand was " not working well around 4pm yesterday." Then around 7pm his wife noted he wasnt putting the fork in the right spot while eating dinner, the food was hitting his cheek. Denies any HA, CP, SOB, palpations, hx of afib. Denies any falls. In the ED patient with CT/A/P without acute findings or LVO. NIH 3. Evaluated by Tele-stroke and not a TPA candidate based on LKW and low NIH. Of note patient with normal glucose on BMP, no hx of DM, and no neuro-glycopenic symptoms, however repeat accu checks with erroneous low readings".
As per MD note, "74y/o M PMH Parkinsons dx, HTN, glaucoma, BPH admitted for r/o CVA".

## 2021-06-07 NOTE — CONSULT NOTE ADULT - SUBJECTIVE AND OBJECTIVE BOX
75yM was admitted on 05-31 with AMS. He was worked up for CVA which was negative.    Imaging performed:  HEAD CT 5/31 - 1. Unremarkable, unenhanced CT head.    CT PERFUSION  5/31 -  Normal perfusion..    CTA BRAIN  5/31 -  Patent intracranial circulation. No flow-limiting stenosis or occlusion.    CTA NECK  5/31 - Patent cervical vasculature. No flow limiting stenosis or occlusion.    MRI HEAD 6/4 - mild periventricular and scattered subcortical white matter ischemia.    Patient reports he is very tired.  He wants to eat and gain his strength, but hasnt been able to due to downgrade s/p MBS.   Reports he has no pain.   Patient is depressed.     REVIEW OF SYSTEMS  Constitutional - No fever, +weight loss, +fatigue  HEENT - No eye pain, +visual disturbances, No difficulty hearing, No tinnitus, No vertigo, No neck pain  Respiratory - No cough, No wheezing, No shortness of breath  Cardiovascular - No chest pain, No palpitations  Gastrointestinal - No abdominal pain, No nausea, No vomiting, No diarrhea, No constipation  Genitourinary - No dysuria, No frequency, No hematuria, No incontinence  Neurological - No headaches, No memory loss, +loss of strength, No numbness, No tremors  Skin - No itching, No rashes, No lesions   Endocrine - No temperature intolerance  Musculoskeletal - No joint pain, No joint swelling, No muscle pain  Psychiatric - +depression, No anxiety    VITALS  T(C): 37.3 (06-07-21 @ 09:09), Max: 37.3 (06-07-21 @ 09:09)  HR: 59 (06-07-21 @ 09:09) (56 - 63)  BP: 144/86 (06-07-21 @ 09:09) (99/63 - 161/88)  RR: 18 (06-07-21 @ 09:09) (16 - 18)  SpO2: 94% (06-07-21 @ 09:09) (94% - 100%)  Wt(kg): --    PAST MEDICAL & SURGICAL HISTORY  Parkinson disease    Glaucoma    HTN (hypertension)    History of BPH    No significant past surgical history        SOCIAL HISTORY - as per documentation/history  Smoking - None  EtOH - None  Drugs - None    FUNCTIONAL HISTORY  Lives with spouse, 7 MANUEL  Independent with [facility]    CURRENT FUNCTIONAL STATUS  6/7  Sit-Stand Transfer Training  Transfer Training Sit-to-Stand Transfer: minimum assist (75% patient effort);  verbal cues;  rolling walker;  weight-bearing as tolerated  Transfer Training Stand-to-Sit Transfer: minimum assist (75% patient effort);  verbal cues;  supervision;  full weight-bearing  Sit-to-Stand Transfer Training Transfer Safety Analysis: decreased proprioception;  decreased balance;  decreased ROM;  cognitive, decreased safety awareness;  decreased strength    Gait Training  Gait Training: supervision;  verbal cues;  rolling walker;  50 feet;  weight-bearing as tolerated   minimum assist (75% patient effort);  Ambulate with RW 15 minute with Min A secondary to A needed for obstacle negotiation, left sides inattention noted.   Gait Analysis: 3-point gait   decreased scarlett;  decreased arm swing;  decreased hip/knee flexion;  decreased step length;  decreased trunk rotation;  decreased ROM;  cognitive, decreased safety awareness;  50 feet;  rolling walker  Gait Number of Times:: x 1    Sit-Stand Transfer Training  Transfer Training Sit-to-Stand Transfer: minimum assist (75% patient effort);  1 person assist  Transfer Training Stand-to-Sit Transfer: minimum assist (75% patient effort);  1 person assist  Sit-to-Stand Transfer Training Transfer Safety Analysis: impaired motor control    Toilet Transfer Training  Transfer Training Toilet Transfer: minimum assist (75% patient effort);  1 person assist;  on low toilet height  Toilet Transfer Training Transfer Safety Analysis: impaired motor control;  impaired postural control    Functional Endurance  Functional Endurance Detail: Pt ambulated with RW in room and bathroom with minimal assist. Pt requires max verbal and physical cues to maintain forward direction ambulating as pt tends to veer to the left. Pt tolerated all well, but requires increased time to complete all tasks.     Lower Body Dressing Training  Lower Body Dressing Training Assistance: minimum assist (75% patient effort);  1 person assist;  to don socks seated in chair    6/6  Bed Mobility  Bed Mobility Training Sit-to-Supine: not observed; pt left OOB in chair with chair alarm on. Pt verbalizing understanding to use call bell  Bed Mobility Training Supine-to-Sit: independent  Bed Mobility Training Limitations: decreased strength    Sit-Stand Transfer Training  Transfer Training Sit-to-Stand Transfer: minimum assist (75% patient effort);  1 person assist;  verbal cues;  rolling walker  Transfer Training Stand-to-Sit Transfer: supervsion;  supervision;  verbal cues;  rolling walker  Sit-to-Stand Transfer Training Transfer Safety Analysis: decreased strength;  impaired balance;  mild retropulsion;  rolling walker    Gait Training  Gait Training: stand-by assist;  1 person assist;  verbal cues;  pt veering to left side frequently requiring cues and/or assist with RW to straighten gait pattern and ensure obstacle clearance. ;  rolling walker;  200 feet  Gait Analysis: 3-point gait   decreased scarlett;  shuffling;  decreased step length;  decreased stride length;  decreased strength;  impaired coordination;  impaired motor control;  200 feet;  rolling walker    Stair Training  Physical Assist/Nonphysical Assist: stand by assist;  1 person assist;  verbal cues  Assistive Device: right rail down;  left rail down;  step to pattern  Number of Stairs: 10     6/5  SLP Treatment: Feeding  SLP Treatment: Feeding (min) Minutes of Treatment: 30   SLP Treatment: Feeding Rehab Effort: good  SLP Treatment: Feeding Treatment Detail: 1-Pt will complete massako maneuver x 10 each w/>75% accuracy w/mod cues - Pt completed mark x10 with good execution given minimal cues. 2-Pt will complete BOT & velar/glottal exercises x 10 each w/>80% accuracy w/min cues- Pt completed 1@10x with good execution fiven minimal cues.  3-Pt will complete laryngeal elevation/closure exercises x 10 each w/>80% accuracy w/min cues - Pt completed falsetto X10 and supraglottic swallow x10 witn good execution and minimal cues.     6/2  Downgrade to NPO w/consideration for short-term non-oral alternative means of nutrition/hydration, as per Pt/family wishes    FAMILY HISTORY   No pertinent family history in first degree relatives        RECENT LABS - Reviewed  CBC Full  -  ( 07 Jun 2021 06:50 )  WBC Count : 5.50 K/uL  RBC Count : 3.95 M/uL  Hemoglobin : 11.8 g/dL  Hematocrit : 35.9 %  Platelet Count - Automated : 188 K/uL  Mean Cell Volume : 90.9 fl  Mean Cell Hemoglobin : 29.9 pg  Mean Cell Hemoglobin Concentration : 32.9 gm/dL  Auto Neutrophil # : x  Auto Lymphocyte # : x  Auto Monocyte # : x  Auto Eosinophil # : x  Auto Basophil # : x  Auto Neutrophil % : x  Auto Lymphocyte % : x  Auto Monocyte % : x  Auto Eosinophil % : x  Auto Basophil % : x    06-07    146<H>  |  113<H>  |  12.2  ----------------------------<  97  3.3<L>   |  21.0<L>  |  0.99    Ca    9.0      07 Jun 2021 06:50          ALLERGIES  No Known Allergies      MEDICATIONS   acetaZOLAMIDE    Tablet 250 milliGRAM(s) Oral at bedtime  aspirin  chewable 81 milliGRAM(s) Oral daily  atorvastatin 80 milliGRAM(s) Oral at bedtime  bisacodyl Suppository 10 milliGRAM(s) Rectal once  brimonidine 0.2% Ophthalmic Solution 1 Drop(s) Both EYES three times a day  carbidopa/levodopa  25/100 2 Tablet(s) Oral daily  carbidopa/levodopa  25/100 1.5 Tablet(s) Oral <User Schedule>  carbidopa/levodopa  25/100 1.5 Tablet(s) Oral at bedtime  dextrose 5%. 1000 milliLiter(s) IV Continuous <Continuous>  heparin   Injectable 5000 Unit(s) SubCutaneous every 12 hours  latanoprost 0.005% Ophthalmic Solution 1 Drop(s) Both EYES at bedtime  metoprolol succinate  milliGRAM(s) Oral daily  pilocarpine 1% Solution 1 Drop(s) Right EYE three times a day  selegiline Oral Tab/Cap 5 milliGRAM(s) Oral two times a day  tamsulosin 0.4 milliGRAM(s) Oral at bedtime  timolol 0.5% Solution 1 Drop(s) Both EYES two times a day      ----------------------------------------------------------------------------------------  PHYSICAL EXAM  Constitutional - NAD, Uncomfortable  HEENT - NCAT   Neck - Supple, No limited ROM  Chest - Breathing comfortably, No wheezing  Cardiovascular - S1S2   Abdomen - Soft   Extremities - No C/C/E, No calf tenderness   Neurologic Exam -                    Cognitive - AAO to self, place, date, year, situation     Communication - Fluent, No dysarthria     Cranial Nerves - CN 2-12 intact     Motor - No focal deficits                    LEFT    UE - ShAB 5/5, EF 5/5, EE 5/5, WE 5/5,  5/5                    RIGHT UE - ShAB 5/5, EF 5/5, EE 5/5, WE 5/5,  5/5                    LEFT    LE - HF 5/5, KE 5/5, DF 5/5, PF 5/5                    RIGHT LE - HF 5/5, KE 5/5, DF 5/5, PF 5/5        Sensory - Intact to LT  Psychiatric - Mood depressed, Fatigued  ----------------------------------------------------------------------------------------  ASSESSMENT/PLAN  75yMale with functional deficits after debility   Parkinson's Disease - Sinemet  CAD - ASA, Lipitor  HTN - Toprol   Pain - Tylenol  Pharyngeal Dysphagia - Puree/Honey, NPO as per SLP, Family refusing NGT/PEG  DVT PPX - SCDs, Heparin   Rehab - Given patient's fluctuating functional status, ambulating 200 feet from one day to the other, as well as diet challenges, would recommend DC HOME with HOME CARE. If family unable to care for patient, may need TONO.   Will continue to follow. Functional progress will determine ongoing rehab dispo recommendations, which may change.    Continue bedside therapy as well as OOB throughout the day with mobilization by staff to maintain cardiopulmonary function and prevention of secondary complications related to debility.     Discussed with rehab team.

## 2021-06-07 NOTE — PROGRESS NOTE ADULT - ASSESSMENT
74y/o M PMH Parkinsons dx, HTN, glaucoma, BPH admitted for r/o CVA.     Dysphagia, Voice change, R sided weakness likely from Parkinson's dx   CVA ruled out     -CTH negative  -CTA head/neck negative   -MRI: mild periventricular and scattered subcortical white matter ischemia; no acute infarct or hemorrhage  -TTE WNL  -Cont asa, statin   -Neuro following   -Fall risk protocol   -PT eval: home with assist, home PT, RW  -Cont home meds   - S/p MBS 6/2 of which recommendations are for NPO. Esophagram was performed on 5/3 as an outpatient which noted a possible filling defect in the hypopharynx/poss fibromuscular polyp.  Possible that hypopharynx defect is worsening patients baseline dysphagia.   -GI consult appreciated   -ENT recs appreciated  -s/p laryngoscopy 6/3: no pharyngeal polyp or diverticula noted   -d/w pt and pt's Wife /daughter Danyelle , do not want PEG or NGT at this time   -Long dw pts cousin Dr Delong [phone] w/pts permission  -Pt is refusing PEG. wants to eat,understood risk of aspiration,pna,respiratory failure,high risk of intubation, high mortality.  Pt wants to eat by understanding risk. He wants to be full code including intubation, resuscitation. Pts family understood risk and agreed with pts decision.  - will start pureed w/necter thick for now with assist feed. Aspiration precaution.   - Family requesting REPEAT MBS in pt. dw pt about aspiration  risk during  mbs. pt understood the risk and willing to do MBS now. spoke with speech schedule 1:30 pm today  -Gentle hydration.bmp   -palliative team is following      #Hypoglycemia   -Resume diet, gentle hydration,hypoglycemia protocol.accucheck  -Hgba1c 5.5  -Not on DM meds, no glycopenic symptoms, no change with dextrose IV, likely false readings; glucose wnl on repeat bmp   -Cont to monitor     #HTN-Stable  -Continue toprol      #Glaucoma   -Cont eye drops     #BPH   -Cont flomax   Pt seen by PT Rec home PT,Ambulated with walker with supervision    Care of plan dw pt  DW   for safe disposition  josiah rn

## 2021-06-07 NOTE — DISCHARGE NOTE PROVIDER - NSDCMRMEDTOKEN_GEN_ALL_CORE_FT
acetaZOLAMIDE 250 mg oral tablet: 1 tab(s) orally once a day  aspirin 81 mg oral tablet: 1 tab(s) orally once a day  brinzolamide-brimonidine 1%-0.2% ophthalmic suspension: 1 drop(s) to each affected eye 2 times a day  Flomax 0.4 mg oral capsule: 1 cap(s) orally once a day  heparin: 5000 unit(s) subcutaneous every 12 hours  pilocarpine 1% ophthalmic solution: 1 drop(s) to each affected eye 3 times a day  Rocklatan 0.02%-0.005% ophthalmic solution: 1 drop(s) in each eye once a day (in the evening)  selegiline 5 mg oral tablet: 1 tab(s) orally 2 times a day  Sinemet 25 mg-100 mg oral tablet: 2 tab(s) orally once a day  Sinemet 25 mg-100 mg oral tablet: 1.5 tab(s) orally once a day at 4pm  Sinemet 25 mg-100 mg oral tablet: 1.5 tab(s) orally once a day (at bedtime)  timolol hemihydrate 0.5% ophthalmic solution: 1 drop(s) to each affected eye 2 times a day  Toprol- mg oral tablet, extended release: 1 tab(s) orally once a day

## 2021-06-07 NOTE — SWALLOW VFSS/MBS ASSESSMENT ADULT - RECOMMENDED CONSISTENCY
Initiation of regular solids w/thin liquids  Strict aspiration precautions   100% supervision/assist for all meals  Meds whole in puree  Upright for all PO/>30 minutes following Initiation of regular solids w/thin liquids

## 2021-06-07 NOTE — DISCHARGE NOTE PROVIDER - NSDCCPCAREPLAN_GEN_ALL_CORE_FT
PRINCIPAL DISCHARGE DIAGNOSIS  Diagnosis: Dysphagia  Assessment and Plan of Treatment:       SECONDARY DISCHARGE DIAGNOSES  Diagnosis: Parkinson disease  Assessment and Plan of Treatment: Parkinson disease    Diagnosis: Hypoglycemia due to other etiology  Assessment and Plan of Treatment: Hypoglycemia due to other etiology    Diagnosis: Essential hypertension  Assessment and Plan of Treatment:     Diagnosis: Unsteady gait  Assessment and Plan of Treatment:     Diagnosis: Acute weakness  Assessment and Plan of Treatment:

## 2021-06-07 NOTE — DISCHARGE NOTE PROVIDER - HOSPITAL COURSE
76y/o M PMH Parkinson dx, HTN, glaucoma, BPH admitted for Dysphagia, Voice change, R sided weakness likely from Parkinson disease, CVA ruled out.  S/p MBS 6/2 of which recommendations are for NPO. 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  Possible that hypopharynx defect is worsening patients baseline dysphagia.   Pt seen by GI and ENT as well neurologist. S/p laryngoscopy 6/3: no pharyngeal polyp or diverticula noted. pt and pt's Wife /daughter Danyelle , do not want PEG or NGT at this time   Repeat MBS 06/07/21-Pt passed. Spoke with speech recommends solid w/thin liquid diet. Patient was seen by PT Rec HOME/HOME pt with supervision but Patients home situation is not safe to return per family.  Pt will be discharge to [facility].        Time spent>35 mins

## 2021-06-07 NOTE — SWALLOW VFSS/MBS ASSESSMENT ADULT - DIAGNOSTIC IMPRESSIONS
Pt presents w/functional oropharyngeal swallow. Oral stage noted for consistent premature pharyngeal entry (to the valleculae w/all semi-solids/solids - to the pyriforms w/nectar & thin liquids), however deemed overall functional.     Pharyngeal phase of swallow deemed WFL, w/NO penetration or aspiration visualized across consistencies throughout entirety of study. Trace-min BOT, valleculae & pyriform sinus residue observed post puree, nectar & thin liquid trials, however cleared w/independent & cued subsequent swallow x 1-2. Pt presents w/functional oropharyngeal swallow. Oral stage noted for consistent premature pharyngeal entry variably between the julia & hypopharynx, however deemed overall functional.     Pharyngeal phase of swallow deemed WFL, w/NO penetration or aspiration visualized across consistencies throughout entirety of study. Trace-min BOT, valleculae & pyriform sinus residue observed post puree, nectar & thin liquid trials, however cleared w/independent & cued subsequent swallow x 1-2. Trace-mild retention of solid PO noted below the level of the UES x 2 only, however cleared successfully w/further PO trials & subsequent swallow.

## 2021-06-07 NOTE — SWALLOW VFSS/MBS ASSESSMENT ADULT - SLP GENERAL OBSERVATIONS
Pt recd awake/upright in stretcher in radiology, A&A Ox3, 0/10 pain pre/post, tolerating RA no overt distress, +hypophonia, however noted improvement in baseline vocal quality in comparison to previous encounter w/this SLP
Pt recd awake & upright in stretcher in radiology, A&A Ox4, 0/10 pain pre/post encounter, tolerating RA no overt distress, increasingly hypophonic, w/intermittent breathy voice & periods of aphonia (change noted from initial encounter), reduced speech intelligibility.

## 2021-06-07 NOTE — PROGRESS NOTE ADULT - PROVIDER SPECIALTY LIST ADULT
Hospitalist
Hospitalist
TeleStroke
Hospitalist
Neurology
Gastroenterology
Neurology
Hospitalist

## 2021-06-17 ENCOUNTER — APPOINTMENT (OUTPATIENT)
Dept: NEUROLOGY | Facility: CLINIC | Age: 76
End: 2021-06-17
Payer: MEDICARE

## 2021-06-17 VITALS
WEIGHT: 161 LBS | SYSTOLIC BLOOD PRESSURE: 118 MMHG | DIASTOLIC BLOOD PRESSURE: 68 MMHG | TEMPERATURE: 97.2 F | HEIGHT: 72 IN | BODY MASS INDEX: 21.81 KG/M2

## 2021-06-17 DIAGNOSIS — G20 PARKINSON'S DISEASE: ICD-10-CM

## 2021-06-17 DIAGNOSIS — Z87.438 PERSONAL HISTORY OF OTHER DISEASES OF MALE GENITAL ORGANS: ICD-10-CM

## 2021-06-17 DIAGNOSIS — Z86.79 PERSONAL HISTORY OF OTHER DISEASES OF THE CIRCULATORY SYSTEM: ICD-10-CM

## 2021-06-17 DIAGNOSIS — Z86.69 PERSONAL HISTORY OF OTHER DISEASES OF THE NERVOUS SYSTEM AND SENSE ORGANS: ICD-10-CM

## 2021-06-17 PROCEDURE — 99213 OFFICE O/P EST LOW 20 MIN: CPT

## 2021-06-17 RX ORDER — SELEGILINE HYDROCHLORIDE 5 MG/1
5 TABLET ORAL
Refills: 0 | Status: ACTIVE | COMMUNITY

## 2021-06-17 RX ORDER — TAMSULOSIN HYDROCHLORIDE 0.4 MG/1
0.4 CAPSULE ORAL
Refills: 0 | Status: ACTIVE | COMMUNITY

## 2021-06-17 RX ORDER — METOPROLOL SUCCINATE 100 MG/1
100 TABLET, EXTENDED RELEASE ORAL
Refills: 0 | Status: ACTIVE | COMMUNITY

## 2021-06-17 RX ORDER — PILOCARPINE HYDROCHLORIDE OPHTHALMIC SOLUTION 10 MG/ML
1 SOLUTION/ DROPS OPHTHALMIC
Refills: 0 | Status: ACTIVE | COMMUNITY

## 2021-06-17 RX ORDER — CARBIDOPA AND LEVODOPA 25; 100 MG/1; MG/1
25-100 TABLET ORAL
Refills: 0 | Status: ACTIVE | COMMUNITY

## 2021-06-17 RX ORDER — IRON/IRON ASP GLY/FA/MV-MIN 38 125-25-1MG
TABLET ORAL
Refills: 0 | Status: ACTIVE | COMMUNITY

## 2021-06-17 RX ORDER — BRINZOLAMIDE/BRIMONIDINE TARTRATE 10; 2 MG/ML; MG/ML
1-0.2 SUSPENSION/ DROPS OPHTHALMIC
Refills: 0 | Status: ACTIVE | COMMUNITY

## 2021-06-17 RX ORDER — DOCUSATE SODIUM 100 MG/1
100 CAPSULE ORAL
Refills: 0 | Status: ACTIVE | COMMUNITY

## 2021-06-17 RX ORDER — SENNOSIDES 8.6 MG TABLETS 8.6 MG/1
8.6 TABLET ORAL
Refills: 0 | Status: ACTIVE | COMMUNITY

## 2021-06-17 RX ORDER — TIMOLOL 5.12 MG/ML
0.5 SOLUTION/ DROPS OPHTHALMIC
Refills: 0 | Status: ACTIVE | COMMUNITY

## 2021-06-17 RX ORDER — NETARSUDIL AND LATANOPROST OPHTHALMIC SOLUTION, 0.02%/0.005% .2; .05 MG/ML; MG/ML
0.02-0.005 SOLUTION/ DROPS OPHTHALMIC; TOPICAL
Refills: 0 | Status: ACTIVE | COMMUNITY

## 2021-06-17 NOTE — HISTORY OF PRESENT ILLNESS
[FreeTextEntry1] : I saw this patient in the office today.\par \par He was originally seen in early June of 2021 at [facility].\par He described a history of Parkinson's disease.\par He presented with increased difficulty with the right hand.\par Workup was negative for stroke.\par \par He had been following with [facility].\par \par He is now in subacute rehabilitation and reports that he is improving slowly.

## 2021-06-17 NOTE — PHYSICAL EXAM
[General Appearance - Alert] : alert [General Appearance - In No Acute Distress] : in no acute distress [Oriented To Time, Place, And Person] : oriented to person, place, and time [Affect] : the affect was normal [Memory Recent] : recent memory was not impaired [Memory Remote] : remote memory was not impaired [Cranial Nerves Optic (II)] : visual acuity intact bilaterally,  visual fields full to confrontation, pupils equal round and reactive to light [Cranial Nerves Oculomotor (III)] : extraocular motion intact [Cranial Nerves Trigeminal (V)] : facial sensation intact symmetrically [Cranial Nerves Facial (VII)] : face symmetrical [Cranial Nerves Vestibulocochlear (VIII)] : hearing was intact bilaterally [Cranial Nerves Glossopharyngeal (IX)] : tongue and palate midline [Motor Strength] : muscle strength was normal in all four extremities [Cranial Nerves Accessory (XI - Cranial And Spinal)] : head turning and shoulder shrug symmetric [Sensation Tactile Decrease] : light touch was intact [Sensation Pain / Temperature Decrease] : pain and temperature was intact [Sensation Vibration Decrease] : vibration was intact [Limited Balance] : the patient's balance was impaired [1+] : Patella left 1+ [Optic Disc Abnormality] : the optic disc were normal in size and color [Dysarthria] : no dysarthria [Aphasia] : no dysphasia/aphasia [Tremor] : a tremor present [Coordination - Dysmetria Impaired Finger-to-Nose Bilateral] : not present [Plantar Reflex Right Only] : normal on the right [Plantar Reflex Left Only] : normal on the left [FreeTextEntry6] : There is some increased tone with cogwheeling at the wrists and elbows bilaterally. [FreeTextEntry8] : The patient presented in a wheelchair today.

## 2021-06-17 NOTE — CONSULT LETTER
[Dear  ___] : Dear  [unfilled], [Courtesy Letter:] : I had the pleasure of seeing your patient, [unfilled], in my office today. [Please see my note below.] : Please see my note below. [Consult Closing:] : Thank you very much for allowing me to participate in the care of this patient.  If you have any questions, please do not hesitate to contact me. [Sincerely,] : Sincerely, [FreeTextEntry3] : Rufus Koch MD.

## 2021-06-17 NOTE — DATA REVIEWED
[de-identified] : Brain MRI was performed on 6/4/21.\par The study was unremarkable.\par There were chronic ischemic changes.

## 2021-06-23 NOTE — SWALLOW BEDSIDE ASSESSMENT ADULT - SWALLOW EVAL: CRITERIA FOR SKILLED INTERVENTION MET
Patient transferred to [facility] at approximately 23:20 last night.  Report given to RN.  VS upon transfer:  /78, HR 72, T 36.1, RR 20, 95% O2 on 2L.  Patient verbalized no complaints at time of transfer.  All valuables and belongings including cell phone, , purse and clothing sent with patient.  CPap machine brought up by respiratory therapy.  Meds tubed up to 3S nurses station.   demonstrates skilled criteria for swallowing intervention

## 2023-10-20 NOTE — PROGRESS NOTE ADULT - TREATMENT GUIDELINE COMMENT
End of Shift Note    Bedside shift change report given to [address] [place] [address]  (oncoming nurse) by Virginia Regalado LPN (offgoing nurse). Report included the following information SBAR, Intake/Output, MAR, Accordion, Recent Results, and Med Rec Status    Shift worked:  7p-7a     Shift summary and any significant changes:    Pt welcomed back to unit. Pt c/o pain x2, given oxycodone x2. Larsen drained [id number] of pink urine. A total of 5 bag (16486oz) hung for CBI. Pt turned q2hrs. Drainage noted surrounding larsen, dressing changed. Labs completed. Concerns for physician to address: none     Zone phone for oncoming shift:  [phone]       Activity:     Number times ambulated in hallways past shift: 0  Number of times OOB to chair past shift: 0    Cardiac:   Cardiac Monitoring: No           Access:  Current line(s): PIV     Genitourinary:   Urinary status: larsen    Respiratory:      Chronic home O2 use?: NO  Incentive spirometer at bedside: N/A       GI:     Current diet:  ADULT DIET;  Regular  Passing flatus: NO  Tolerating current diet: YES       Pain Management:   Patient states pain is manageable on current regimen: YES    Skin:     Interventions: float heels, increase time out of bed, and internal/external urinary devices    Patient Safety:  Fall Score:    Interventions: gripper socks and pt to call before getting OOB       Length of Stay:  Expected LOS: 3  Actual LOS: [place], LPN full code including intubation, resuscitation.

## 2024-05-14 NOTE — PATIENT PROFILE ADULT - NSASFALLNEEDSASSISTWITH_GEN_A_NUR
I spoke with the daughter and she did confirm that the patient had at least 4 episodes of urination with notable blood. Patient denies any dizziness, lightheadedness, and daughter states blood pressure has been at baseline. Eliquis was started about a month ago with no previous issues. I recommended that the daughter also check in with the patient's urologist while we confirm with  about further instructions. She verbalized understanding.    standing/walking/toileting